# Patient Record
Sex: FEMALE | Race: WHITE | HISPANIC OR LATINO | Employment: UNEMPLOYED | URBAN - METROPOLITAN AREA
[De-identification: names, ages, dates, MRNs, and addresses within clinical notes are randomized per-mention and may not be internally consistent; named-entity substitution may affect disease eponyms.]

---

## 2017-07-05 ENCOUNTER — ALLSCRIPTS OFFICE VISIT (OUTPATIENT)
Dept: OTHER | Facility: OTHER | Age: 37
End: 2017-07-05

## 2017-07-05 DIAGNOSIS — G56.02 CARPAL TUNNEL SYNDROME OF LEFT WRIST: ICD-10-CM

## 2017-07-05 DIAGNOSIS — J02.9 ACUTE PHARYNGITIS: ICD-10-CM

## 2017-07-07 ENCOUNTER — APPOINTMENT (OUTPATIENT)
Dept: LAB | Facility: HOSPITAL | Age: 37
End: 2017-07-07
Payer: COMMERCIAL

## 2017-07-07 ENCOUNTER — ALLSCRIPTS OFFICE VISIT (OUTPATIENT)
Dept: OTHER | Facility: OTHER | Age: 37
End: 2017-07-07

## 2017-07-07 DIAGNOSIS — J02.9 ACUTE PHARYNGITIS: ICD-10-CM

## 2017-07-07 LAB — S PYO AG THROAT QL: NEGATIVE

## 2017-07-07 PROCEDURE — 87147 CULTURE TYPE IMMUNOLOGIC: CPT

## 2017-07-07 PROCEDURE — 87070 CULTURE OTHR SPECIMN AEROBIC: CPT

## 2017-07-10 ENCOUNTER — GENERIC CONVERSION - ENCOUNTER (OUTPATIENT)
Dept: OTHER | Facility: OTHER | Age: 37
End: 2017-07-10

## 2017-07-10 LAB — BACTERIA THROAT CULT: NORMAL

## 2017-10-30 ENCOUNTER — ALLSCRIPTS OFFICE VISIT (OUTPATIENT)
Dept: OTHER | Facility: OTHER | Age: 37
End: 2017-10-30

## 2017-10-31 NOTE — PROGRESS NOTES
Assessment  1  Strain of left trapezius muscle, initial encounter (840 8) (J18 297U)    Plan  Strain of left trapezius muscle, initial encounter    · Cyclobenzaprine HCl - 5 MG Oral Tablet; TAKE 1 TABLET 3 TIMES DAILY AS  NEEDED   · Naproxen 500 MG Oral Tablet; TAKE 1 TABLET EVERY 12 HOURS WITH FOOD  AS NEEDED   · Follow-up PRN Evaluation and Treatment  Follow-up  Status: Complete  Done:  [id number]    Discussion/Summary    Yarely Meléndez is stable on exam  She is to f/u PRN  left trapezius muscle strain  treat at this time with Naproxen PRN with food (she is to avoid taking OTC NSAIDs when using this med), and Cyclobenzaprine PRN for muscular spasm  She is to use heat to the region, stretch, and avoid lifting for a couple days  The patient was counseled regarding instructions for management,-- risk factor reductions,-- impressions,-- importance of compliance with treatment  Possible side effects of new medications were reviewed with the patient/guardian today  The treatment plan was reviewed with the patient/guardian  The patient/guardian understands and agrees with the treatment plan      Chief Complaint  PT is being seen today due to having pain on left side of neck that radiates down to shoulder  PT is not able to move her neck towards the left side due to pain  This started last night  No heavy lifting; Does not remember sleeping awkwardly  No referral down the arm  No CP/SOB  No hx of neck surgery  Pt is a   is not pregnant  History of Present Illness  HPI: As above  Review of Systems    Constitutional: as noted in HPI  Cardiovascular: as noted in HPI  Respiratory: as noted in HPI  Musculoskeletal: as noted in HPI  Neurological: as noted in HPI  Active Problems  1  Acute carpal tunnel syndrome of left wrist (354 0) (G56 02)   2  Acute tonsillitis, unspecified etiology (463) (J03 90)   3  Anxiety (300 00) (F41 9)   4  Encounter for routine pelvic examination (G22 31) (Z01 419)   5  Finger mass, right (782 2) (R22 31)   6  History of dermoid cyst excision (V15 29) (Z98 890,Z86 018)   7  Sore throat (462) (J02 9)    Past Medical History  1  History of Abnormal blood chemistry (790 6) (R79 9)   2  Childhood asthma (493 00) (J45 909)   3  History of Cyst, ovary, dermoid (220) (D27 9)   4  History of Exposure to STD (V01 6) (Z20 2)   5  History of shortness of breath (V13 89) (Z87 898)   6  History of Leg edema (782 3) (R60 0)   7  History of Lightheadedness (780 4) (R42)  Active Problems And Past Medical History Reviewed: The active problems and past medical history were reviewed and updated today  Family History  Mother    1  Family history of Depression  Father    2  Family history of Alcoholism   3  Family history of End Stage Renal Disease  Maternal Grandmother    4  Family history of Cataract   5  Family history of Hyperlipidemia  Maternal Grandfather    6  Family history of Type 2 Diabetes Mellitus    Social History   · Being A Social Drinker   · Currently sexually active   · Drinks coffee   · Exercise habits   · Never A Smoker   · Remote social alcohol use   · Denied: History of Uses drugs daily    Surgical History  1  History of Oophorectomy - Unilateral (Removal Of One Ovary)  Surgical History Reviewed: The surgical history was reviewed and updated today  Current Meds   1  Biotin TABS Recorded   2  Daily Multiple Vitamins Oral Tablet Recorded    The medication list was reviewed and updated today  Allergies  1  No Known Drug Allergies    Vitals   Recorded: 27SQK3227 06:19PM   Heart Rate 72   Respiration 16   Systolic 769   Diastolic 70   Height 5 ft 4 in   Weight 135 lb 8 oz   BMI Calculated 23 26   BSA Calculated 1 66     Physical Exam    Constitutional   General appearance: Abnormal   well developed,-- uncomfortable,-- well nourished,-- well hydrated-- and-- Uncomfortable due to her left neck pain; VSS; pleasant     Lymphatic   Palpation of lymph nodes in neck: No lymphadenopathy  -- Pt with TTP in the left lower para-cervical musculature, through the distribution of the left trapezius muscle; no step-offs or TTP over the spinous processes  Musculoskeletal   Gait and station: Normal     Neurologic   Reflexes: 2+ and symmetric  Deep tendon reflexes: 2+ right biceps,-- 2+ left biceps,-- 2+ right triceps,-- 2+ left triceps,-- 2+ right brachioradialis-- and-- 2+ left brachioradialis  -- Muscle strength equal and normal in the bilateral upper extremities  Sensation: No sensory loss  -- Normal in the bilateral upper extremities     Psychiatric   Orientation to person, place, and time: Normal     Mood and affect: Normal          Signatures   Electronically signed by : Sudeep Lucero DO; Oct 30 2017  7:00PM EST                       (Author)

## 2018-01-11 NOTE — RESULT NOTES
Discussion/Summary   throat culture grew Group C strep  continue amoxicillin as directed     - Dr Lg Mcintosh      Verified Results  (1) THROAT CULTURE (CULTURE, UPPER RESPIRATORY) [id number] 09:47PM Nicole Bajwa Order Number: [id number]     Test Name Result Flag Reference   CLINICAL REPORT (Report)     Test:        Throat culture  Specimen Type:   Throat  Specimen Date:   7/7/2017 9:47 PM  Result Date:    7/10/2017 8:15 AM  Result Status:   Final result  Resulting Lab:   [place] [address]            Tel: [phone]      CULTURE                                       ------------------                                   Few Colonies of Beta Hemolytic Streptococcus Group C     *** This organism is intrinisically susceptible to Penicillin  If sensitivites to other antibiotics are required, please call the      Microbiology Department at [phone] within 5 days   ***       Signatures   Electronically signed by : Zelalem Fregoso MD; Jul 10 2017  9:19AM EST                       (Author)

## 2018-01-13 VITALS
HEART RATE: 72 BPM | HEIGHT: 64 IN | RESPIRATION RATE: 16 BRPM | WEIGHT: 135.5 LBS | DIASTOLIC BLOOD PRESSURE: 70 MMHG | SYSTOLIC BLOOD PRESSURE: 100 MMHG | BODY MASS INDEX: 23.13 KG/M2

## 2018-01-13 VITALS
RESPIRATION RATE: 16 BRPM | TEMPERATURE: 98.2 F | HEART RATE: 80 BPM | SYSTOLIC BLOOD PRESSURE: 110 MMHG | DIASTOLIC BLOOD PRESSURE: 82 MMHG | WEIGHT: 131.38 LBS

## 2018-01-14 VITALS
HEART RATE: 72 BPM | WEIGHT: 133 LBS | SYSTOLIC BLOOD PRESSURE: 110 MMHG | BODY MASS INDEX: 22.71 KG/M2 | DIASTOLIC BLOOD PRESSURE: 72 MMHG | HEIGHT: 64 IN

## 2018-07-02 ENCOUNTER — OFFICE VISIT (OUTPATIENT)
Dept: FAMILY MEDICINE CLINIC | Facility: CLINIC | Age: 38
End: 2018-07-02
Payer: COMMERCIAL

## 2018-07-02 VITALS
RESPIRATION RATE: 16 BRPM | HEART RATE: 70 BPM | DIASTOLIC BLOOD PRESSURE: 68 MMHG | SYSTOLIC BLOOD PRESSURE: 114 MMHG | WEIGHT: 133 LBS | BODY MASS INDEX: 22.83 KG/M2

## 2018-07-02 DIAGNOSIS — L30.9 DERMATITIS: Primary | ICD-10-CM

## 2018-07-02 PROCEDURE — 99213 OFFICE O/P EST LOW 20 MIN: CPT | Performed by: FAMILY MEDICINE

## 2018-07-02 RX ORDER — ASCORBATE CALCIUM 500 MG
500 TABLET ORAL
COMMUNITY
End: 2019-08-20

## 2018-07-02 RX ORDER — DIPHENOXYLATE HYDROCHLORIDE AND ATROPINE SULFATE 2.5; .025 MG/1; MG/1
1 TABLET ORAL
COMMUNITY
End: 2022-08-09

## 2018-07-02 RX ORDER — BIOTIN 10 MG
TABLET ORAL
COMMUNITY
End: 2019-08-20

## 2018-07-02 NOTE — PROGRESS NOTES
Assessment/Plan:    No problem-specific Assessment & Plan notes found for this encounter  Diagnoses and all orders for this visit:    Dermatitis  Comments:  Possible mild folliculitis - resolving  Continue OTC Topical abtbx BID x 3 more days  Keep area dry  Call if any further issues  Other orders  -     multivitamin (THERAGRAN) TABS; Take 1 tablet by mouth  -     Omega-3 Fatty Acids (FISH OIL PO); Take 2 g by mouth  -     CALCIUM CARBONATE-VITAMIN D PO; Take 1 tablet by mouth  -     Calcium Ascorbate 500 MG TABS; Take 500 mg by mouth  -     Biotin 10 MG TABS; Take by mouth          Subjective:      Patient ID: Mag Palmer is a 43 y o  female  Pt with a pimple lesion to the pubic region x 4 days - > noticed after doing a lot of walking, and then it burned when showering  OTC Triple Antibiotic ointment seems to have helped  Chaperone / Standby for today's exam:  Sarahann Cranker, [place]  Back Pain   Pertinent negatives include no fever  The following portions of the patient's history were reviewed and updated as appropriate: allergies, current medications, past family history, past social history, past surgical history and problem list     History reviewed  No pertinent past medical history  Current Outpatient Prescriptions:     Biotin 10 MG TABS, Take by mouth, Disp: , Rfl:     Calcium Ascorbate 500 MG TABS, Take 500 mg by mouth, Disp: , Rfl:     CALCIUM CARBONATE-VITAMIN D PO, Take 1 tablet by mouth, Disp: , Rfl:     multivitamin (THERAGRAN) TABS, Take 1 tablet by mouth, Disp: , Rfl:     Omega-3 Fatty Acids (FISH OIL PO), Take 2 g by mouth, Disp: , Rfl:   No current facility-administered medications for this visit       Facility-Administered Medications Ordered in Other Visits:     lidocaine-epinephrine (XYLOCAINE/EPINEPHRINE) 1 %-1:100,000 20 mL, sodium bicarbonate 2 mEq infiltration, , Infiltration, Once, Brian Fernando MD    No Known Allergies      Review of Systems Constitutional: Negative for activity change and fever  Skin: Positive for rash  Objective:      /68 (BP Location: Right arm, Patient Position: Sitting, Cuff Size: Standard)   Pulse 70   Resp 16   Wt 60 3 kg (133 lb)   BMI 22 83 kg/m²          Physical Exam   Constitutional: She is oriented to person, place, and time  She appears well-developed and well-nourished  No distress  HENT:   Head: Normocephalic and atraumatic  Genitourinary:       There is no rash on the right labia  There is no rash on the left labia  Neurological: She is alert and oriented to person, place, and time  Skin: She is not diaphoretic  Psychiatric: She has a normal mood and affect  Her behavior is normal  Judgment and thought content normal    Nursing note and vitals reviewed

## 2019-01-16 ENCOUNTER — OFFICE VISIT (OUTPATIENT)
Dept: FAMILY MEDICINE CLINIC | Facility: CLINIC | Age: 39
End: 2019-01-16

## 2019-01-16 ENCOUNTER — TELEPHONE (OUTPATIENT)
Dept: FAMILY MEDICINE CLINIC | Facility: CLINIC | Age: 39
End: 2019-01-16

## 2019-01-16 VITALS
RESPIRATION RATE: 16 BRPM | WEIGHT: 139.4 LBS | BODY MASS INDEX: 24.7 KG/M2 | HEIGHT: 63 IN | SYSTOLIC BLOOD PRESSURE: 118 MMHG | DIASTOLIC BLOOD PRESSURE: 78 MMHG | HEART RATE: 78 BPM | TEMPERATURE: 98.6 F | OXYGEN SATURATION: 96 %

## 2019-01-16 DIAGNOSIS — J20.9 ACUTE BRONCHITIS, UNSPECIFIED ORGANISM: Primary | ICD-10-CM

## 2019-01-16 PROCEDURE — 99212 OFFICE O/P EST SF 10 MIN: CPT | Performed by: FAMILY MEDICINE

## 2019-01-16 RX ORDER — PROMETHAZINE HYDROCHLORIDE AND CODEINE PHOSPHATE 6.25; 1 MG/5ML; MG/5ML
5 SYRUP ORAL EVERY 6 HOURS PRN
Qty: 180 ML | Refills: 0 | Status: SHIPPED | OUTPATIENT
Start: 2019-01-16 | End: 2019-08-20

## 2019-01-16 RX ORDER — AZITHROMYCIN 250 MG/1
TABLET, FILM COATED ORAL
Qty: 6 TABLET | Refills: 0 | Status: SHIPPED | OUTPATIENT
Start: 2019-01-16 | End: 2019-01-21

## 2019-01-16 NOTE — TELEPHONE ENCOUNTER
Patient saw you today and at check out mentioned something about you sending something for her cough she said it was going to have codeine in it   Please advise    Pharmacy is [facility]

## 2019-01-16 NOTE — PROGRESS NOTES
Assessment/Plan:    No problem-specific Assessment & Plan notes found for this encounter  Diagnoses and all orders for this visit:    Acute bronchitis, unspecified organism  Comments:  Pt stable on exam   Treating with Azithromycin, warm salt water gargles, OTC Cough/Cold Preps PRN, Albuterol PRN (has), rest, and good PO hydration  Orders:  -     azithromycin (ZITHROMAX) 250 mg tablet; Take 2 tablets by mouth on day #1, then 1 tab daily for four more days  -     promethazine-codeine (PHENERGAN WITH CODEINE) 6 25-10 mg/5 mL syrup; Take 5 mL by mouth every 6 (six) hours as needed for cough          Subjective:      Patient ID: Stacia Smith is a 43 y o  female  Zelphia Brakeman has been sick over the last 2 weeks  Started with fevers and ache; this went to congestion and ST; now with a bad cough  She is not pregnant at this time  The following portions of the patient's history were reviewed and updated as appropriate: allergies, current medications, past family history, past medical history, past surgical history and problem list     Past Medical History:   Diagnosis Date    Asthma     Last Assessed 08Jun2012         Current Outpatient Prescriptions:     azithromycin (ZITHROMAX) 250 mg tablet, Take 2 tablets by mouth on day #1, then 1 tab daily for four more days  , Disp: 6 tablet, Rfl: 0    Biotin 10 MG TABS, Take by mouth, Disp: , Rfl:     Calcium Ascorbate 500 MG TABS, Take 500 mg by mouth, Disp: , Rfl:     CALCIUM CARBONATE-VITAMIN D PO, Take 1 tablet by mouth, Disp: , Rfl:     multivitamin (THERAGRAN) TABS, Take 1 tablet by mouth, Disp: , Rfl:     Omega-3 Fatty Acids (FISH OIL PO), Take 2 g by mouth, Disp: , Rfl:     promethazine-codeine (PHENERGAN WITH CODEINE) 6 25-10 mg/5 mL syrup, Take 5 mL by mouth every 6 (six) hours as needed for cough, Disp: 180 mL, Rfl: 0  No current facility-administered medications for this visit       Facility-Administered Medications Ordered in Other Visits:   lidocaine-epinephrine (XYLOCAINE/EPINEPHRINE) 1 %-1:100,000 20 mL, sodium bicarbonate 2 mEq infiltration, , Infiltration, Once, Jennyfer Macias MD    No Known Allergies    Review of Systems   Constitutional: Negative for activity change and fever  HENT: Positive for congestion  Negative for sore throat  Respiratory: Positive for cough  Musculoskeletal: Negative for myalgias  Objective:      /78 (BP Location: Right arm, Patient Position: Sitting, Cuff Size: Standard)   Pulse 78   Temp 98 6 °F (37 °C) (Tympanic)   Resp 16   Ht 5' 3" (1 6 m)   Wt 63 2 kg (139 lb 6 4 oz)   SpO2 96%   BMI 24 69 kg/m²          Physical Exam   Constitutional: She is oriented to person, place, and time  She appears well-developed and well-nourished  No distress  HENT:   Head: Normocephalic and atraumatic  Right Ear: Hearing, tympanic membrane, external ear and ear canal normal    Left Ear: Hearing, tympanic membrane, external ear and ear canal normal    Nose: Mucosal edema present  Mouth/Throat: Oropharynx is clear and moist  No oropharyngeal exudate  Eyes: Conjunctivae are normal    Neck: Normal range of motion  Neck supple  No thyromegaly present  Cardiovascular: Normal rate, regular rhythm and normal heart sounds  Exam reveals no gallop and no friction rub  No murmur heard  Pulmonary/Chest: Effort normal and breath sounds normal  No respiratory distress  She has no wheezes  She has no rales  Lymphadenopathy:     She has no cervical adenopathy  Neurological: She is alert and oriented to person, place, and time  Skin: She is not diaphoretic  Psychiatric: She has a normal mood and affect  Her behavior is normal  Judgment and thought content normal    Nursing note and vitals reviewed

## 2019-08-20 ENCOUNTER — OFFICE VISIT (OUTPATIENT)
Dept: FAMILY MEDICINE CLINIC | Facility: CLINIC | Age: 39
End: 2019-08-20
Payer: COMMERCIAL

## 2019-08-20 VITALS
HEART RATE: 78 BPM | RESPIRATION RATE: 16 BRPM | DIASTOLIC BLOOD PRESSURE: 76 MMHG | WEIGHT: 138 LBS | HEIGHT: 63 IN | OXYGEN SATURATION: 99 % | BODY MASS INDEX: 24.45 KG/M2 | SYSTOLIC BLOOD PRESSURE: 102 MMHG | TEMPERATURE: 97.8 F

## 2019-08-20 DIAGNOSIS — L65.9 HAIR LOSS: Primary | ICD-10-CM

## 2019-08-20 DIAGNOSIS — R53.83 OTHER FATIGUE: ICD-10-CM

## 2019-08-20 PROCEDURE — 3008F BODY MASS INDEX DOCD: CPT | Performed by: FAMILY MEDICINE

## 2019-08-20 PROCEDURE — 1036F TOBACCO NON-USER: CPT | Performed by: FAMILY MEDICINE

## 2019-08-20 PROCEDURE — 99213 OFFICE O/P EST LOW 20 MIN: CPT | Performed by: FAMILY MEDICINE

## 2019-08-20 NOTE — PROGRESS NOTES
Assessment/Plan:    No problem-specific Assessment & Plan notes found for this encounter  Diagnoses and all orders for this visit:    Hair loss  Comments:  Pt is stable on exam   Pt to avoid pulling hair back tight in ponytail  Check non-fasting labs - if TSH is normal, would likely then refer to Dermatology  Orders:  -     Comprehensive metabolic panel; Future  -     CBC and differential; Future  -     TSH, 3rd generation with Free T4 reflex; Future    Other fatigue  -     Comprehensive metabolic panel; Future  -     CBC and differential; Future  -     TSH, 3rd generation with Free T4 reflex; Future    Other orders  -     Cholecalciferol 1000 units capsule          Subjective:      Patient ID: Hellen Kan is a 35 y o  female  Increasing hair loss in the last 2 months; no changes in weight  Mother with hx of hyperthyroidism  No new meds / supplements  Has not colored her hair recently  The following portions of the patient's history were reviewed and updated as appropriate: allergies, current medications, past family history, past social history, past surgical history and problem list     Past Medical History:   Diagnosis Date    Asthma     Last Assessed 08Jun2012     Family History   Problem Relation Age of Onset    Depression Mother     Alcohol abuse Father     Kidney disease Father     Cataracts Maternal Grandmother     Hyperlipidemia Maternal Grandmother     Diabetes Maternal Grandfather        Current Outpatient Medications:     CALCIUM CARBONATE-VITAMIN D PO, Take 1 tablet by mouth, Disp: , Rfl:     Cholecalciferol 1000 units capsule, , Disp: , Rfl:     multivitamin (THERAGRAN) TABS, Take 1 tablet by mouth, Disp: , Rfl:     Omega-3 Fatty Acids (FISH OIL PO), Take 2 g by mouth, Disp: , Rfl:   No current facility-administered medications for this visit       Facility-Administered Medications Ordered in Other Visits:     lidocaine-epinephrine (XYLOCAINE/EPINEPHRINE) 1 %-1:100,000 20 mL, sodium bicarbonate 2 mEq infiltration, , Infiltration, Once, Paul Bautista MD    No Known Allergies    Social History     Tobacco Use    Smoking status: Never Smoker   Substance Use Topics    Alcohol use: Yes     Comment: social    Drug use: No         Review of Systems   Constitutional: Negative for activity change and unexpected weight change  Respiratory: Negative for shortness of breath  Cardiovascular: Negative for chest pain  Gastrointestinal: Negative for constipation and diarrhea  Skin:        +Hair loss  Psychiatric/Behavioral: Negative for dysphoric mood  The patient is not nervous/anxious  Objective:      /76 (BP Location: Left arm, Patient Position: Sitting, Cuff Size: Standard)   Pulse 78   Temp 97 8 °F (36 6 °C) (Tympanic)   Resp 16   Ht 5' 3" (1 6 m)   Wt 62 6 kg (138 lb)   SpO2 99%   BMI 24 45 kg/m²          Physical Exam   Constitutional: She is oriented to person, place, and time  She appears well-developed and well-nourished  No distress  HENT:   Head: Normocephalic and atraumatic  Eyes: Conjunctivae are normal    Neck: Normal range of motion  Neck supple  No thyromegaly present  Cardiovascular: Normal rate, regular rhythm and normal heart sounds  Exam reveals no gallop and no friction rub  No murmur heard  Pulmonary/Chest: Effort normal and breath sounds normal  No stridor  No respiratory distress  She has no wheezes  She has no rales  Lymphadenopathy:     She has no cervical adenopathy  Neurological: She is alert and oriented to person, place, and time  Reflex Scores:       Bicep reflexes are 2+ on the right side and 2+ on the left side  Brachioradialis reflexes are 2+ on the right side and 2+ on the left side  Patellar reflexes are 2+ on the right side and 2+ on the left side  Skin: She is not diaphoretic  Psychiatric: She has a normal mood and affect   Her behavior is normal  Judgment and thought content normal    Nursing note and vitals reviewed

## 2019-11-04 ENCOUNTER — OFFICE VISIT (OUTPATIENT)
Dept: FAMILY MEDICINE CLINIC | Facility: CLINIC | Age: 39
End: 2019-11-04

## 2019-11-04 VITALS
HEIGHT: 63 IN | RESPIRATION RATE: 16 BRPM | SYSTOLIC BLOOD PRESSURE: 114 MMHG | HEART RATE: 85 BPM | OXYGEN SATURATION: 98 % | BODY MASS INDEX: 25.02 KG/M2 | DIASTOLIC BLOOD PRESSURE: 84 MMHG | WEIGHT: 141.2 LBS

## 2019-11-04 DIAGNOSIS — F43.9 SITUATIONAL STRESS: Primary | ICD-10-CM

## 2019-11-04 DIAGNOSIS — F51.04 PSYCHOPHYSIOLOGICAL INSOMNIA: ICD-10-CM

## 2019-11-04 PROCEDURE — 99213 OFFICE O/P EST LOW 20 MIN: CPT | Performed by: NURSE PRACTITIONER

## 2019-11-04 RX ORDER — ESCITALOPRAM OXALATE 10 MG/1
10 TABLET ORAL DAILY
Qty: 30 TABLET | Refills: 5 | Status: SHIPPED | OUTPATIENT
Start: 2019-11-04 | End: 2020-11-25

## 2019-11-04 RX ORDER — ZOLPIDEM TARTRATE 5 MG/1
5 TABLET ORAL
Qty: 30 TABLET | Refills: 1 | Status: SHIPPED | OUTPATIENT
Start: 2019-11-04 | End: 2020-11-25

## 2019-11-04 NOTE — PROGRESS NOTES
Assessment/Plan:           Problem List Items Addressed This Visit        Other    Psychophysiological insomnia    Relevant Medications    zolpidem (AMBIEN) 5 mg tablet    Situational stress - Primary    Relevant Medications    escitalopram (LEXAPRO) 10 mg tablet            Subjective:      Patient ID: Leny Martell is a 35 y o  female  HPI    The following portions of the patient's history were reviewed and updated as appropriate: allergies, current medications, past family history, past medical history, past social history, past surgical history and problem list     Review of Systems   Constitutional: Negative for fatigue and fever  Respiratory: Negative for cough, shortness of breath and wheezing  Cardiovascular: Negative for chest pain and palpitations  Gastrointestinal: Negative for constipation, diarrhea, nausea and vomiting  Skin: Negative for rash  Neurological: Negative for dizziness, light-headedness and headaches  Hematological: Negative for adenopathy  Psychiatric/Behavioral: Positive for dysphoric mood and sleep disturbance  Negative for agitation, behavioral problems, confusion, decreased concentration, hallucinations, self-injury and suicidal ideas  The patient is not nervous/anxious and is not hyperactive            Objective:      /84 (BP Location: Left arm, Patient Position: Sitting, Cuff Size: Standard)   Pulse 85   Resp 16   Ht 5' 3" (1 6 m)   Wt 64 kg (141 lb 3 2 oz)   SpO2 98%   BMI 25 01 kg/m²     Family History   Problem Relation Age of Onset    Depression Mother     Alcohol abuse Father     Kidney disease Father     Cataracts Maternal Grandmother     Hyperlipidemia Maternal Grandmother     Diabetes Maternal Grandfather      Past Medical History:   Diagnosis Date    Asthma     Last Assessed [id number]     Social History     Socioeconomic History    Marital status: Single     Spouse name: Not on file    Number of children: Not on file    Years of education: Not on file    Highest education level: Not on file   Occupational History    Not on file   Social Needs    Financial resource strain: Not on file    Food insecurity:     Worry: Not on file     Inability: Not on file    Transportation needs:     Medical: Not on file     Non-medical: Not on file   Tobacco Use    Smoking status: Never Smoker   Substance and Sexual Activity    Alcohol use: Yes     Comment: social    Drug use: No    Sexual activity: Yes   Lifestyle    Physical activity:     Days per week: Not on file     Minutes per session: Not on file    Stress: Not on file   Relationships    Social connections:     Talks on phone: Not on file     Gets together: Not on file     Attends Taoist service: Not on file     Active member of club or organization: Not on file     Attends meetings of clubs or organizations: Not on file     Relationship status: Not on file    Intimate partner violence:     Fear of current or ex partner: Not on file     Emotionally abused: Not on file     Physically abused: Not on file     Forced sexual activity: Not on file   Other Topics Concern    Not on file   Social History Narrative    Drinks coffee       Current Outpatient Medications:     CALCIUM CARBONATE-VITAMIN D PO, Take 1 tablet by mouth, Disp: , Rfl:     Cholecalciferol 1000 units capsule, , Disp: , Rfl:     multivitamin (THERAGRAN) TABS, Take 1 tablet by mouth, Disp: , Rfl:     Omega-3 Fatty Acids (FISH OIL PO), Take 2 g by mouth, Disp: , Rfl:     escitalopram (LEXAPRO) 10 mg tablet, Take 1 tablet (10 mg total) by mouth daily, Disp: 30 tablet, Rfl: 5    zolpidem (AMBIEN) 5 mg tablet, Take 1 tablet (5 mg total) by mouth daily at bedtime as needed for sleep, Disp: 30 tablet, Rfl: 1  No current facility-administered medications for this visit       Facility-Administered Medications Ordered in Other Visits:     lidocaine-epinephrine (XYLOCAINE/EPINEPHRINE) 1 %-1:100,000 20 mL, sodium bicarbonate 2 mEq infiltration, , Infiltration, Once, Vandy Runner, MD  No Known Allergies     Physical Exam   Constitutional: She is oriented to person, place, and time  She appears well-developed and well-nourished  HENT:   Head: Normocephalic and atraumatic  Right Ear: External ear normal    Left Ear: External ear normal    Nose: Nose normal    Mouth/Throat: Oropharynx is clear and moist    Eyes: Conjunctivae are normal    Neck: Normal range of motion  Neck supple  No thyromegaly present  Cardiovascular: Normal rate, regular rhythm and normal heart sounds  Pulmonary/Chest: Effort normal and breath sounds normal    Abdominal: Soft  Bowel sounds are normal    Musculoskeletal: Normal range of motion  Neurological: She is alert and oriented to person, place, and time  Skin: Skin is warm and dry  Capillary refill takes less than 2 seconds  Psychiatric: She has a normal mood and affect  Her behavior is normal  Judgment and thought content normal    Nursing note and vitals reviewed

## 2019-11-18 ENCOUNTER — TELEPHONE (OUTPATIENT)
Dept: FAMILY MEDICINE CLINIC | Facility: CLINIC | Age: 39
End: 2019-11-18

## 2019-11-18 NOTE — TELEPHONE ENCOUNTER
Left voice msg for pt - Per Ko Hanks she can try doubling medication to 10mg at bedtime and see if that helps and to call us and we can refill prescription for 10mg when needed

## 2020-01-10 ENCOUNTER — OFFICE VISIT (OUTPATIENT)
Dept: FAMILY MEDICINE CLINIC | Facility: CLINIC | Age: 40
End: 2020-01-10
Payer: COMMERCIAL

## 2020-01-10 VITALS
BODY MASS INDEX: 25.87 KG/M2 | RESPIRATION RATE: 16 BRPM | TEMPERATURE: 98.7 F | HEART RATE: 79 BPM | DIASTOLIC BLOOD PRESSURE: 62 MMHG | OXYGEN SATURATION: 99 % | HEIGHT: 63 IN | SYSTOLIC BLOOD PRESSURE: 100 MMHG | WEIGHT: 146 LBS

## 2020-01-10 DIAGNOSIS — R19.5 LOOSE STOOLS: Primary | ICD-10-CM

## 2020-01-10 PROCEDURE — 3008F BODY MASS INDEX DOCD: CPT | Performed by: NURSE PRACTITIONER

## 2020-01-10 PROCEDURE — 1036F TOBACCO NON-USER: CPT | Performed by: NURSE PRACTITIONER

## 2020-01-10 PROCEDURE — 99213 OFFICE O/P EST LOW 20 MIN: CPT | Performed by: NURSE PRACTITIONER

## 2020-01-10 NOTE — PROGRESS NOTES
Assessment/Plan:           Problem List Items Addressed This Visit        Other    Loose stools - Primary     To restart probiotic daily  Stool culture  F/u if no resolution or worsens           Relevant Orders    Clostridium difficile toxin by PCR with EIA            Subjective:      Patient ID: Alicia Roper is a 35 y o  female  Here for c/o loose stools  Was on clindamycin for dental procedure, was to take probiotics, did not  Now with loose stools with mucous  No blood  Not all the time  No abd cramping or pain  No foul smell  Did not restart probiotics        The following portions of the patient's history were reviewed and updated as appropriate: allergies, current medications, past family history, past medical history, past social history, past surgical history and problem list     Review of Systems   Constitutional: Negative for fever and unexpected weight change  Respiratory: Negative for cough and shortness of breath  Cardiovascular: Negative for chest pain and palpitations  Gastrointestinal: Positive for diarrhea  Negative for abdominal distention, abdominal pain, constipation, nausea and vomiting  Musculoskeletal: Negative for arthralgias and myalgias  Skin: Negative for rash  Neurological: Negative for dizziness, light-headedness and headaches  Hematological: Negative for adenopathy  Psychiatric/Behavioral: Negative for dysphoric mood  The patient is not nervous/anxious            Objective:      /62 (BP Location: Left arm, Patient Position: Sitting, Cuff Size: Standard)   Pulse 79   Temp 98 7 °F (37 1 °C) (Tympanic)   Resp 16   Ht 5' 3" (1 6 m)   Wt 66 2 kg (146 lb)   SpO2 99%   BMI 25 86 kg/m²     Family History   Problem Relation Age of Onset    Depression Mother     Alcohol abuse Father     Kidney disease Father     Cataracts Maternal Grandmother     Hyperlipidemia Maternal Grandmother     Diabetes Maternal Grandfather      Past Medical History:   Diagnosis Date  Asthma     Last Assessed 08Jun2012     Social History     Socioeconomic History    Marital status: Single     Spouse name: Not on file    Number of children: Not on file    Years of education: Not on file    Highest education level: Not on file   Occupational History    Not on file   Social Needs    Financial resource strain: Not on file    Food insecurity:     Worry: Not on file     Inability: Not on file    Transportation needs:     Medical: Not on file     Non-medical: Not on file   Tobacco Use    Smoking status: Never Smoker    Smokeless tobacco: Never Used   Substance and Sexual Activity    Alcohol use: Yes     Comment: social    Drug use: No    Sexual activity: Yes   Lifestyle    Physical activity:     Days per week: Not on file     Minutes per session: Not on file    Stress: Not on file   Relationships    Social connections:     Talks on phone: Not on file     Gets together: Not on file     Attends Christianity service: Not on file     Active member of club or organization: Not on file     Attends meetings of clubs or organizations: Not on file     Relationship status: Not on file    Intimate partner violence:     Fear of current or ex partner: Not on file     Emotionally abused: Not on file     Physically abused: Not on file     Forced sexual activity: Not on file   Other Topics Concern    Not on file   Social History Narrative    Drinks coffee       Current Outpatient Medications:     CALCIUM CARBONATE-VITAMIN D PO, Take 1 tablet by mouth, Disp: , Rfl:     multivitamin (THERAGRAN) TABS, Take 1 tablet by mouth, Disp: , Rfl:     Omega-3 Fatty Acids (FISH OIL PO), Take 2 g by mouth, Disp: , Rfl:     Cholecalciferol 1000 units capsule, , Disp: , Rfl:     escitalopram (LEXAPRO) 10 mg tablet, Take 1 tablet (10 mg total) by mouth daily (Patient not taking: Reported on 1/10/2020), Disp: 30 tablet, Rfl: 5    zolpidem (AMBIEN) 5 mg tablet, Take 1 tablet (5 mg total) by mouth daily at bedtime as needed for sleep (Patient not taking: Reported on 1/10/2020), Disp: 30 tablet, Rfl: 1  No current facility-administered medications for this visit  Facility-Administered Medications Ordered in Other Visits:     lidocaine-epinephrine (XYLOCAINE/EPINEPHRINE) 1 %-1:100,000 20 mL, sodium bicarbonate 2 mEq infiltration, , Infiltration, Once, Rober Sadler MD  No Known Allergies     Physical Exam   Constitutional: She is oriented to person, place, and time  She appears well-developed and well-nourished  Eyes: Conjunctivae are normal    Cardiovascular: Normal rate, regular rhythm and normal heart sounds  Pulmonary/Chest: Effort normal and breath sounds normal    Abdominal: Soft  Bowel sounds are normal    Musculoskeletal: Normal range of motion  Neurological: She is alert and oriented to person, place, and time  Skin: Skin is warm and dry  Capillary refill takes less than 2 seconds  Psychiatric: She has a normal mood and affect  Her behavior is normal    Nursing note and vitals reviewed  BMI Counseling: Body mass index is 25 86 kg/m²  The BMI is above normal  Nutrition recommendations include reducing portion sizes, decreasing overall calorie intake, 3-5 servings of fruits/vegetables daily, reducing fast food intake, consuming healthier snacks, decreasing soda and/or juice intake, moderation in carbohydrate intake, increasing intake of lean protein, reducing intake of saturated fat and trans fat and reducing intake of cholesterol  Exercise recommendations include moderate aerobic physical activity for 150 minutes/week, exercising 3-5 times per week and strength training exercises

## 2020-01-13 PROBLEM — R19.5 LOOSE STOOLS: Status: ACTIVE | Noted: 2020-01-13

## 2020-10-27 ENCOUNTER — TELEPHONE (OUTPATIENT)
Dept: OBGYN CLINIC | Facility: HOSPITAL | Age: 40
End: 2020-10-27

## 2020-10-28 ENCOUNTER — OFFICE VISIT (OUTPATIENT)
Dept: OBGYN CLINIC | Facility: HOSPITAL | Age: 40
End: 2020-10-28
Payer: COMMERCIAL

## 2020-10-28 VITALS
DIASTOLIC BLOOD PRESSURE: 65 MMHG | HEIGHT: 63 IN | SYSTOLIC BLOOD PRESSURE: 100 MMHG | WEIGHT: 152.8 LBS | BODY MASS INDEX: 27.07 KG/M2 | HEART RATE: 88 BPM

## 2020-10-28 DIAGNOSIS — M77.11 LATERAL EPICONDYLITIS OF RIGHT ELBOW: Primary | ICD-10-CM

## 2020-10-28 PROCEDURE — 99203 OFFICE O/P NEW LOW 30 MIN: CPT | Performed by: ORTHOPAEDIC SURGERY

## 2020-10-28 PROCEDURE — 3008F BODY MASS INDEX DOCD: CPT | Performed by: ORTHOPAEDIC SURGERY

## 2020-11-12 ENCOUNTER — EVALUATION (OUTPATIENT)
Dept: OCCUPATIONAL THERAPY | Age: 40
End: 2020-11-12
Payer: COMMERCIAL

## 2020-11-12 DIAGNOSIS — M77.11 LATERAL EPICONDYLITIS OF RIGHT ELBOW: Primary | ICD-10-CM

## 2020-11-12 PROCEDURE — 97165 OT EVAL LOW COMPLEX 30 MIN: CPT

## 2020-11-12 PROCEDURE — 97110 THERAPEUTIC EXERCISES: CPT

## 2020-11-25 DIAGNOSIS — F51.04 PSYCHOPHYSIOLOGICAL INSOMNIA: ICD-10-CM

## 2020-11-25 DIAGNOSIS — F43.9 SITUATIONAL STRESS: ICD-10-CM

## 2020-11-25 RX ORDER — ESCITALOPRAM OXALATE 10 MG/1
TABLET ORAL
Qty: 30 TABLET | Refills: 5 | Status: SHIPPED | OUTPATIENT
Start: 2020-11-25 | End: 2021-07-23 | Stop reason: ALTCHOICE

## 2020-11-25 RX ORDER — ZOLPIDEM TARTRATE 5 MG/1
TABLET ORAL
Qty: 30 TABLET | Refills: 5 | Status: SHIPPED | OUTPATIENT
Start: 2020-11-25 | End: 2021-07-23 | Stop reason: ALTCHOICE

## 2020-12-30 ENCOUNTER — TELEMEDICINE (OUTPATIENT)
Dept: FAMILY MEDICINE CLINIC | Facility: CLINIC | Age: 40
End: 2020-12-30
Payer: COMMERCIAL

## 2020-12-30 DIAGNOSIS — U07.1 COVID-19: Primary | ICD-10-CM

## 2020-12-30 PROCEDURE — 99213 OFFICE O/P EST LOW 20 MIN: CPT | Performed by: FAMILY MEDICINE

## 2020-12-31 ENCOUNTER — TELEMEDICINE (OUTPATIENT)
Dept: FAMILY MEDICINE CLINIC | Facility: CLINIC | Age: 40
End: 2020-12-31
Payer: COMMERCIAL

## 2020-12-31 DIAGNOSIS — U07.1 COVID-19: Primary | ICD-10-CM

## 2020-12-31 PROCEDURE — 99213 OFFICE O/P EST LOW 20 MIN: CPT | Performed by: FAMILY MEDICINE

## 2021-01-21 ENCOUNTER — TELEPHONE (OUTPATIENT)
Dept: FAMILY MEDICINE CLINIC | Facility: CLINIC | Age: 41
End: 2021-01-21

## 2021-01-21 DIAGNOSIS — E55.9 VITAMIN D DEFICIENCY: ICD-10-CM

## 2021-01-21 DIAGNOSIS — E83.42 HYPOMAGNESEMIA: Primary | ICD-10-CM

## 2021-01-21 NOTE — TELEPHONE ENCOUNTER
Patient called and wanted to know if you can order a magnesium lab, she said some of her symptoms are a cause of low magnesium, joint pain, and tired  she said she has been reading a lot about and wanted to know if you would order the lab  Please advise    Lab   [facility]

## 2021-01-23 ENCOUNTER — LAB (OUTPATIENT)
Dept: LAB | Facility: HOSPITAL | Age: 41
End: 2021-01-23
Payer: COMMERCIAL

## 2021-01-23 DIAGNOSIS — E55.9 VITAMIN D DEFICIENCY: ICD-10-CM

## 2021-01-23 DIAGNOSIS — E83.42 HYPOMAGNESEMIA: ICD-10-CM

## 2021-01-23 LAB
25(OH)D3 SERPL-MCNC: 22.4 NG/ML (ref 30–100)
MAGNESIUM SERPL-MCNC: 2.2 MG/DL (ref 1.6–2.6)

## 2021-01-23 PROCEDURE — 82306 VITAMIN D 25 HYDROXY: CPT

## 2021-01-23 PROCEDURE — 83735 ASSAY OF MAGNESIUM: CPT

## 2021-01-23 PROCEDURE — 36415 COLL VENOUS BLD VENIPUNCTURE: CPT

## 2021-02-04 ENCOUNTER — TELEMEDICINE (OUTPATIENT)
Dept: FAMILY MEDICINE CLINIC | Facility: CLINIC | Age: 41
End: 2021-02-04
Payer: COMMERCIAL

## 2021-02-04 DIAGNOSIS — J02.9 SORE THROAT: Primary | ICD-10-CM

## 2021-02-04 PROCEDURE — 99213 OFFICE O/P EST LOW 20 MIN: CPT | Performed by: FAMILY MEDICINE

## 2021-02-04 RX ORDER — AMOXICILLIN 500 MG/1
500 CAPSULE ORAL 2 TIMES DAILY
Qty: 20 CAPSULE | Refills: 0 | Status: SHIPPED | OUTPATIENT
Start: 2021-02-04 | End: 2021-02-14

## 2021-02-04 NOTE — PROGRESS NOTES
Virtual Regular Visit      Assessment/Plan:    Problem List Items Addressed This Visit     None      Visit Diagnoses     Sore throat    -  Primary    Pt stable on exam  Treating with Amoxicillin, warm salt water gargles, OTC Cough/Cold Preps PRN, rest, and good PO hydration  F/u PRN  Relevant Medications    amoxicillin (AMOXIL) 500 mg capsule               Reason for visit is   Chief Complaint   Patient presents with    Virtual Regular Visit        Encounter provider Jarrod Sales DO    Provider located at [address] [address]      Recent Visits  No visits were found meeting these conditions  Showing recent visits within past 7 days and meeting all other requirements     Today's Visits  Date Type Provider Dept   02/04/21 Telemedicine DO Bishnu Newby   Showing today's visits and meeting all other requirements     Future Appointments  No visits were found meeting these conditions  Showing future appointments within next 150 days and meeting all other requirements        The patient was identified by name and date of birth  Mariana Gorman was informed that this is a telemedicine visit and that the visit is being conducted through [facility] and patient was informed that this is a secure, HIPAA-compliant platform  She agrees to proceed     My office door was closed  No one else was in the room  She acknowledged consent and understanding of privacy and security of the video platform  The patient has agreed to participate and understands they can discontinue the visit at any time  Patient is aware this is a billable service  Subjective  Mariana Gorman is a 38 y o  female - Sore throat  Ruby Brought c/o a "very bad" ST over the last 3 days  No cough or fevers; no cold symptoms  Pt did have, and was cleared from, COVID-19 in late 12/2020  She has already had the first COV-19 Vaccine injection 1 - 2 weeks ago    Very low suspicion for recurrent COV-19 at this time - given the timing of her symptoms after having COV-19, etc        Past Medical History:   Diagnosis Date    Asthma     Last Assessed 08Jun2012       Past Surgical History:   Procedure Laterality Date    OOPHORECTOMY N/A     Lateral       Current Outpatient Medications   Medication Sig Dispense Refill    amoxicillin (AMOXIL) 500 mg capsule Take 1 capsule (500 mg total) by mouth 2 (two) times a day for 10 days 20 capsule 0    CALCIUM CARBONATE-VITAMIN D PO Take 1 tablet by mouth      Cholecalciferol 1000 units capsule       escitalopram (LEXAPRO) 10 mg tablet TAKE ONE TABLET BY MOUTH EVERY DAY 30 tablet 5    multivitamin (THERAGRAN) TABS Take 1 tablet by mouth      Omega-3 Fatty Acids (FISH OIL PO) Take 2 g by mouth      zolpidem (AMBIEN) 5 mg tablet TAKE ONE TABLET BY MOUTH AT BEDTIME AS NEEDED FOR SLEEP 30 tablet 5     No current facility-administered medications for this visit  Facility-Administered Medications Ordered in Other Visits   Medication Dose Route Frequency Provider Last Rate Last Admin    lidocaine-epinephrine (XYLOCAINE/EPINEPHRINE) 1 %-1:100,000 20 mL, sodium bicarbonate 2 mEq infiltration   Infiltration Once Dale Alvarez MD            No Known Allergies    Review of Systems   Constitutional: Negative for activity change and fever  HENT: Positive for sore throat  Negative for congestion and rhinorrhea  Respiratory: Negative for cough  Video Exam    There were no vitals filed for this visit  Physical Exam  Constitutional:       General: She is not in acute distress  Appearance: Normal appearance  She is not ill-appearing, toxic-appearing or diaphoretic  HENT:      Head: Normocephalic and atraumatic  Eyes:      General: No scleral icterus  Conjunctiva/sclera: Conjunctivae normal    Pulmonary:      Effort: Pulmonary effort is normal  No respiratory distress     Neurological:      Mental Status: She is alert and oriented to person, place, and time  Psychiatric:         Mood and Affect: Mood normal          Behavior: Behavior normal          Thought Content: Thought content normal          Judgment: Judgment normal           I spent 15 minutes with patient today in which greater than 50% of the time was spent in counseling/coordination of care regarding her symptoms  VIRTUAL VISIT DISCLAIMER    Jonathan Askew acknowledges that she has consented to an online visit or consultation  She understands that the online visit is based solely on information provided by her, and that, in the absence of a face-to-face physical evaluation by the physician, the diagnosis she receives is both limited and provisional in terms of accuracy and completeness  This is not intended to replace a full medical face-to-face evaluation by the physician  Jonathan Azar understands and accepts these terms

## 2021-04-19 ENCOUNTER — OFFICE VISIT (OUTPATIENT)
Dept: URGENT CARE | Age: 41
End: 2021-04-19
Payer: COMMERCIAL

## 2021-04-19 ENCOUNTER — APPOINTMENT (OUTPATIENT)
Dept: RADIOLOGY | Age: 41
End: 2021-04-19
Payer: COMMERCIAL

## 2021-04-19 VITALS
OXYGEN SATURATION: 97 % | HEART RATE: 107 BPM | RESPIRATION RATE: 18 BRPM | DIASTOLIC BLOOD PRESSURE: 54 MMHG | TEMPERATURE: 97.2 F | SYSTOLIC BLOOD PRESSURE: 110 MMHG

## 2021-04-19 DIAGNOSIS — M25.561 ACUTE PAIN OF RIGHT KNEE: Primary | ICD-10-CM

## 2021-04-19 DIAGNOSIS — M25.561 ACUTE PAIN OF RIGHT KNEE: ICD-10-CM

## 2021-04-19 PROCEDURE — 73564 X-RAY EXAM KNEE 4 OR MORE: CPT

## 2021-04-19 PROCEDURE — 99213 OFFICE O/P EST LOW 20 MIN: CPT | Performed by: NURSE PRACTITIONER

## 2021-04-19 NOTE — PATIENT INSTRUCTIONS
Knee Pain   WHAT YOU NEED TO KNOW:   Knee pain may start suddenly, or it may be a long-term problem  You may have pain on the side, front, or back of your knee  You may have knee stiffness and swelling  You may hear popping sounds or feel like your knee is giving way or locking up as you walk  You may feel pain when you sit, stand, walk, or climb up and down stairs  Knee pain can be caused by conditions such as obesity, inflammation, or strains or tears in ligaments or tendons  DISCHARGE INSTRUCTIONS:   Return to the emergency department if:   · Your pain is worse, even after treatment  · You cannot bend or straighten your leg completely  · The swelling around your knee does not go down even with treatment  · Your knee is painful and hot to the touch  Contact your healthcare provider if:   · You have questions or concerns about your condition or care  Medicines: You may need any of the following:  · NSAIDs  help decrease swelling and pain or fever  This medicine is available with or without a doctor's order  NSAIDs can cause stomach bleeding or kidney problems in certain people  If you take blood thinner medicine, always ask your healthcare provider if NSAIDs are safe for you  Always read the medicine label and follow directions  · Acetaminophen  decreases pain and fever  It is available without a doctor's order  Ask how much to take and how often to take it  Follow directions  Read the labels of all other medicines you are using to see if they also contain acetaminophen, or ask your doctor or pharmacist  Acetaminophen can cause liver damage if not taken correctly  Do not use more than 4 grams (4,000 milligrams) total of acetaminophen in one day  · Prescription pain medicine  may be given  Ask your healthcare provider how to take this medicine safely  Some prescription pain medicines contain acetaminophen   Do not take other medicines that contain acetaminophen without talking to your healthcare provider  Too much acetaminophen may cause liver damage  Prescription pain medicine may cause constipation  Ask your healthcare provider how to prevent or treat constipation  · Take your medicine as directed  Contact your healthcare provider if you think your medicine is not helping or if you have side effects  Tell him or her if you are allergic to any medicine  Keep a list of the medicines, vitamins, and herbs you take  Include the amounts, and when and why you take them  Bring the list or the pill bottles to follow-up visits  Carry your medicine list with you in case of an emergency  What you can do to manage your symptoms:   · Rest your knee so it can heal   Limit activities that increase your pain  Do low-impact exercises, such as walking or swimming  · Apply ice to help reduce swelling and pain  Use an ice pack, or put crushed ice in a plastic bag  Cover it with a towel before you apply it to your knee  Apply ice for 15 to 20 minutes every hour, or as directed  · Apply compression to help reduce swelling  Use a brace or bandage only as directed  · Elevate your knee to help decrease pain and swelling  Elevate your knee while you are sitting or lying down  Prop your leg on pillows to keep your knee above the level of your heart  · Prevent your knee from moving as directed  Your healthcare provider may put on a cast or splint  You may need to wear a leg brace to stabilize your knee  A leg brace can be adjusted to increase your range of motion as your knee heals  What you can do to prevent knee pain:   · Maintain a healthy weight  Extra weight increases your risk for knee pain  Ask your healthcare provider how much you should weigh  He or she can help you create a safe weight loss plan if you need to lose weight  · Exercise or train properly  Use the correct equipment for sports  Wear shoes that provide good support   Check your posture often as you exercise, play sports, or train for an event  This can help prevent stress and strain on your knees  Rest between sessions so you do not overwork your knees  Follow up with your healthcare provider within 24 hours or as directed: You may need follow-up treatments, such as steroid injections to decrease pain  Write down your questions so you remember to ask them during your visits  © Copyright [address] Information is for End User's use only and may not be sold, redistributed or otherwise used for commercial purposes  All illustrations and images included in CareNotes® are the copyrighted property of [employer]  or [facility] Gala Flores   The above information is an  only  It is not intended as medical advice for individual conditions or treatments  Talk to your doctor, nurse or pharmacist before following any medical regimen to see if it is safe and effective for you

## 2021-04-19 NOTE — PROGRESS NOTES
[address] [facility] [facility]        NAME: Glen Carr is a 38 y o  female  : 1980    MRN: [id number]  DATE: 2021  TIME: 1:02 PM    Assessment and Plan   Acute pain of right knee [M25 561]  1  Acute pain of right knee  XR knee 4+ vw right injury         Patient Instructions     X-rays of the R knee - resulting pending  Aleve or ibuprofen prn  Recommend additional testing via PCP;  if appropriate to r/o fluid in the knee or problems with ligaments/tendons  Follow up with PCP in 3-5 days  Proceed to  ER if symptoms worsen  Chief Complaint     Chief Complaint   Patient presents with    Knee Pain     right x month         History of Present Illness       HPI   Reports right knee pain x 2 months  Denies trauma  No fall  Denies Hx of previous knee pain  No radiation of pain  Rates at 5/10  Achy pain  Worse with full flexion and when exercising  Got worse a few days ago while at massage, when the massage staff was bending the knee for this patient  Review of Systems   Review of Systems   Constitutional: Negative for chills and fever  Respiratory: Negative for cough and shortness of breath  Cardiovascular: Negative for chest pain  Gastrointestinal: Negative for nausea and vomiting  Musculoskeletal: Positive for arthralgias (right knee), gait problem (d/t knee pain) and joint swelling  Skin: Negative for rash and wound  Neurological: Negative for weakness and numbness           Current Medications       Current Outpatient Medications:     CALCIUM CARBONATE-VITAMIN D PO, Take 1 tablet by mouth, Disp: , Rfl:     Cholecalciferol 1000 units capsule, , Disp: , Rfl:     escitalopram (LEXAPRO) 10 mg tablet, TAKE ONE TABLET BY MOUTH EVERY DAY, Disp: 30 tablet, Rfl: 5    multivitamin (THERAGRAN) TABS, Take 1 tablet by mouth, Disp: , Rfl:     Omega-3 Fatty Acids (FISH OIL PO), Take 2 g by mouth, Disp: , Rfl:     zolpidem (AMBIEN) 5 mg tablet, TAKE ONE TABLET BY MOUTH AT BEDTIME AS NEEDED FOR SLEEP, Disp: 30 tablet, Rfl: 5  No current facility-administered medications for this visit  Facility-Administered Medications Ordered in Other Visits:     lidocaine-epinephrine (XYLOCAINE/EPINEPHRINE) 1 %-1:100,000 20 mL, sodium bicarbonate 2 mEq infiltration, , Infiltration, Once, Leo Young MD    Current Allergies     Allergies as of 04/19/2021    (No Known Allergies)            The following portions of the patient's history were reviewed and updated as appropriate: allergies, current medications, past family history, past medical history, past social history, past surgical history and problem list      Past Medical History:   Diagnosis Date    Asthma     Last Assessed 08Jun2012       Past Surgical History:   Procedure Laterality Date    OOPHORECTOMY N/A     Lateral       Family History   Problem Relation Age of Onset    Depression Mother     Alcohol abuse Father     Kidney disease Father     Cataracts Maternal Grandmother     Hyperlipidemia Maternal Grandmother     Diabetes Maternal Grandfather          Medications have been verified  Objective   /54   Pulse (!) 107   Temp (!) 97 2 °F (36 2 °C)   Resp 18   LMP 03/22/2021   SpO2 97%   Patient's last menstrual period was 03/22/2021  Physical Exam     Physical Exam  Musculoskeletal:         General: Swelling (right knee, both medial and lateral aspects, mild) and tenderness (with palpation of the R knee) present  Skin:     Coloration: Skin is not jaundiced  Findings: No bruising, erythema or lesion  Neurological:      Mental Status: She is alert  Motor: No weakness  Gait: Gait abnormal (limps, favoring the R knee)

## 2021-04-21 ENCOUNTER — OFFICE VISIT (OUTPATIENT)
Dept: FAMILY MEDICINE CLINIC | Facility: CLINIC | Age: 41
End: 2021-04-21
Payer: COMMERCIAL

## 2021-04-21 VITALS
HEART RATE: 79 BPM | WEIGHT: 144.6 LBS | DIASTOLIC BLOOD PRESSURE: 62 MMHG | SYSTOLIC BLOOD PRESSURE: 106 MMHG | OXYGEN SATURATION: 99 % | BODY MASS INDEX: 25.61 KG/M2 | RESPIRATION RATE: 16 BRPM | TEMPERATURE: 98.9 F

## 2021-04-21 DIAGNOSIS — M25.561 ACUTE PAIN OF RIGHT KNEE: Primary | ICD-10-CM

## 2021-04-21 PROCEDURE — 99213 OFFICE O/P EST LOW 20 MIN: CPT | Performed by: FAMILY MEDICINE

## 2021-04-21 NOTE — PROGRESS NOTES
FAMILY PRACTICE OFFICE VISIT       NAME: Ty Henry  AGE: 38 y o  SEX: female       : 1980        MRN: [id number]    DATE: 2021  TIME: 5:35 PM    Assessment and Plan   1  Acute pain of right knee  Comments:  Pt stable - knee overall stable on exam  Ortho referral placed  OTC NSAIDs PRN with food, cold therapy, elevation, knee sleeve  Disc modification of activities  Orders:  -     Ambulatory referral to Orthopedic Surgery; Future         There are no Patient Instructions on file for this visit  Chief Complaint   No chief complaint on file  History of Present Illness   Ty Henry is a 38y o -year-old female who c/o right knee pain x 2 weeks  No clear inciting event  She had a massage - felt some discomfort when the person bent her knee  No hx of prior injury or surgery  Did go to the urgent care - no acute findings on xrays  Review of Systems   Review of Systems   Constitutional: Negative for activity change and fever  Musculoskeletal: Positive for arthralgias         Active Problem List     Patient Active Problem List   Diagnosis    Anxiety    Situational stress    Psychophysiological insomnia    Loose stools         Past Medical History:  Past Medical History:   Diagnosis Date    Asthma     Last Assessed 2012       Past Surgical History:  Past Surgical History:   Procedure Laterality Date    OOPHORECTOMY N/A     Lateral       Family History:  Family History   Problem Relation Age of Onset    Depression Mother     Alcohol abuse Father     Kidney disease Father     Cataracts Maternal Grandmother     Hyperlipidemia Maternal Grandmother     Diabetes Maternal Grandfather        Social History:  Social History     Socioeconomic History    Marital status: Single     Spouse name: Not on file    Number of children: Not on file    Years of education: Not on file    Highest education level: Not on file   Occupational History    Not on file Social Needs    Financial resource strain: Not on file    Food insecurity     Worry: Not on file     Inability: Not on file    Transportation needs     Medical: Not on file     Non-medical: Not on file   Tobacco Use    Smoking status: Never Smoker    Smokeless tobacco: Never Used   Substance and Sexual Activity    Alcohol use: Yes     Comment: social    Drug use: No    Sexual activity: Yes   Lifestyle    Physical activity     Days per week: Not on file     Minutes per session: Not on file    Stress: Not on file   Relationships    Social connections     Talks on phone: Not on file     Gets together: Not on file     Attends [employer] service: Not on file     Active member of club or organization: Not on file     Attends meetings of clubs or organizations: Not on file     Relationship status: Not on file    Intimate partner violence     Fear of current or ex partner: Not on file     Emotionally abused: Not on file     Physically abused: Not on file     Forced sexual activity: Not on file   Other Topics Concern    Not on file   Social History Narrative    Drinks coffee       Objective     Vitals:    04/21/21 1403   BP: 106/62   Pulse: 79   Resp: 16   Temp: 98 9 °F (37 2 °C)   SpO2: 99%     Wt Readings from Last 3 Encounters:   04/21/21 65 6 kg (144 lb 9 6 oz)   10/28/20 69 3 kg (152 lb 12 8 oz)   01/10/20 66 2 kg (146 lb)       Physical Exam  Vitals signs and nursing note reviewed  Constitutional:       General: She is not in acute distress  Appearance: Normal appearance  She is not ill-appearing, toxic-appearing or diaphoretic  HENT:      Head: Normocephalic and atraumatic  Musculoskeletal:      Right knee: She exhibits swelling  She exhibits normal range of motion, no erythema, no LCL laxity, normal patellar mobility, normal meniscus and no MCL laxity  Legs:    Neurological:      Mental Status: She is alert and oriented to person, place, and time     Psychiatric:         Mood and Affect: Mood normal          Behavior: Behavior normal          Thought Content:  Thought content normal          Judgment: Judgment normal          Pertinent Laboratory/Diagnostic Studies:  Lab Results   Component Value Date    GLUCOSE 88 04/19/2014    BUN 13 04/19/2014    CREATININE 0 73 04/19/2014    CALCIUM 9 2 04/19/2014     (L) 04/19/2014    K 3 9 04/19/2014    CO2 27 04/19/2014     04/19/2014     Lab Results   Component Value Date    ALT 21 04/19/2014    AST 13 04/19/2014    ALKPHOS 75 04/19/2014    BILITOT 0 35 04/19/2014       Lab Results   Component Value Date    WBC 5 95 04/19/2014    HGB 11 3 (L) 04/19/2014    HCT 33 3 (L) 04/19/2014    MCV 87 04/19/2014     04/19/2014       No results found for: TSH    Lab Results   Component Value Date    CHOL 162 08/09/2015     Lab Results   Component Value Date    TRIG 79 08/09/2015     Lab Results   Component Value Date    HDL 82 08/09/2015     Lab Results   Component Value Date    LDLCALC 64 08/09/2015     Lab Results   Component Value Date    HGBA1C 5 6 08/09/2015       Results for orders placed or performed in visit on 01/23/21   Magnesium   Result Value Ref Range    Magnesium 2 2 1 6 - 2 6 mg/dL   Vitamin D 25 hydroxy   Result Value Ref Range    Vit D, 25-Hydroxy 22 4 (L) 30 0 - 100 0 ng/mL       Orders Placed This Encounter   Procedures    Ambulatory referral to Orthopedic Surgery       ALLERGIES:  No Known Allergies    Current Medications     Current Outpatient Medications   Medication Sig Dispense Refill    CALCIUM CARBONATE-VITAMIN D PO Take 1 tablet by mouth      Cholecalciferol 1000 units capsule       multivitamin (THERAGRAN) TABS Take 1 tablet by mouth      zolpidem (AMBIEN) 5 mg tablet TAKE ONE TABLET BY MOUTH AT BEDTIME AS NEEDED FOR SLEEP 30 tablet 5    escitalopram (LEXAPRO) 10 mg tablet TAKE ONE TABLET BY MOUTH EVERY DAY (Patient not taking: Reported on 4/21/2021) 30 tablet 5    Omega-3 Fatty Acids (FISH OIL PO) Take 2 g by mouth No current facility-administered medications for this visit        Facility-Administered Medications Ordered in Other Visits   Medication Dose Route Frequency Provider Last Rate Last Admin    lidocaine-epinephrine (XYLOCAINE/EPINEPHRINE) 1 %-1:100,000 20 mL, sodium bicarbonate 2 mEq infiltration   Infiltration Once Walt Friday, MD             Health Maintenance     Health Maintenance   Topic Date Due    Annual Physical  Never done    Depression Screening PHQ  01/10/2021    BMI: Followup Plan  01/13/2021    COVID-19 Vaccine (2 - Moderna 2-dose series) 02/22/2021    Influenza Vaccine (1) 06/30/2021 (Originally 9/1/2020)    DTaP,Tdap,and Td Vaccines (1 - Tdap) 04/21/2022 (Originally 4/26/2000)    OT PLAN OF CARE  04/21/2022 (Originally 12/12/2020)    Cervical Cancer Screening  10/06/2021    BMI: Adult  04/21/2022    HIV Screening  Completed    Hepatitis C Screening  Completed    Pneumococcal Vaccine: Pediatrics (0 to 5 Years) and At-Risk Patients (6 to 59 Years)  Aged Out    HIB Vaccine  Aged Out    Hepatitis B Vaccine  Aged Out    IPV Vaccine  Aged Out    Hepatitis A Vaccine  Aged Out    Meningococcal ACWY Vaccine  Aged Out    HPV Vaccine  Aged [employer] History   Administered Date(s) Administered    SARS-CoV-2 / COVID-19 mRNA IM ([facility]) 01/25/2021          Michael [address] Cee Cameron DO

## 2021-04-22 ENCOUNTER — OFFICE VISIT (OUTPATIENT)
Dept: OBGYN CLINIC | Facility: HOSPITAL | Age: 41
End: 2021-04-22
Payer: COMMERCIAL

## 2021-04-22 ENCOUNTER — TELEPHONE (OUTPATIENT)
Dept: FAMILY MEDICINE CLINIC | Facility: CLINIC | Age: 41
End: 2021-04-22

## 2021-04-22 VITALS
HEIGHT: 63 IN | SYSTOLIC BLOOD PRESSURE: 110 MMHG | HEART RATE: 65 BPM | BODY MASS INDEX: 25.87 KG/M2 | DIASTOLIC BLOOD PRESSURE: 70 MMHG | WEIGHT: 146 LBS

## 2021-04-22 DIAGNOSIS — B37.3 VULVOVAGINAL CANDIDIASIS: Primary | ICD-10-CM

## 2021-04-22 DIAGNOSIS — M17.11 PRIMARY OSTEOARTHRITIS OF RIGHT KNEE: ICD-10-CM

## 2021-04-22 DIAGNOSIS — M25.561 ACUTE PAIN OF RIGHT KNEE: Primary | ICD-10-CM

## 2021-04-22 PROCEDURE — 99213 OFFICE O/P EST LOW 20 MIN: CPT | Performed by: ORTHOPAEDIC SURGERY

## 2021-04-22 RX ORDER — FLUCONAZOLE 150 MG/1
150 TABLET ORAL ONCE
Qty: 1 TABLET | Refills: 0 | Status: SHIPPED | OUTPATIENT
Start: 2021-04-22 | End: 2021-04-22

## 2021-04-22 NOTE — TELEPHONE ENCOUNTER
Pt called re: she has symptoms of a yeast infection  Requesting an Rx to treat       Pharmacy:   [id number] [facility], [address] [place] [address]  Phone: [phone] Fax: [phone]

## 2021-04-22 NOTE — PROGRESS NOTES
Assessment:  1  Acute pain of right knee  Ambulatory referral to Orthopedic Surgery    Brace    meloxicam (MOBIC) 7 5 mg tablet    Pt stable - knee overall stable on exam  Ortho referral placed  OTC NSAIDs PRN with food, cold therapy, elevation, knee sleeve  Disc modification of activities  2  Primary osteoarthritis of right knee  Brace    meloxicam (MOBIC) 7 5 mg tablet       Plan:  Acute right knee pain, improved  · Meloxicam 7 5mg daily prescribed   · Right knee hinged brace provided  · Consider physical therapy if pain persists  · Follow up in 4 week    To do next visit:  Return in about 4 weeks (around 5/20/2021)  The above stated was discussed in layman's terms and the patient expressed understanding  All questions were answered to the patient's satisfaction  Scribe Attestation    I,:  Nicole Colindres am acting as a scribe while in the presence of the attending physician :       I,:  Taran Johnson MD personally performed the services described in this documentation    as scribed in my presence :             Subjective:   Beata Braswell is a 38 y o  female who presents for initial evaluation of right knee  She has had symptoms for 2 months with no injury  Today she complains of anteromedial and lateral knee pain  She denies mechanical symptoms  She rates her pain at 0/10 and 10/10 at its worse  Deep flexion aggravates while ice and elevation alleviates  She has used IBU with benefit  She denies past injections or surgery of the knee          Review of systems negative unless otherwise specified in HPI    Past Medical History:   Diagnosis Date    Asthma     Last Assessed 08Jun2012       Past Surgical History:   Procedure Laterality Date    OOPHORECTOMY N/A     Lateral       Family History   Problem Relation Age of Onset    Depression Mother     Alcohol abuse Father     Kidney disease Father     Cataracts Maternal Grandmother     Hyperlipidemia Maternal Grandmother     Diabetes Maternal Grandfather        Social History     Occupational History    Not on file   Tobacco Use    Smoking status: Never Smoker    Smokeless tobacco: Never Used   Substance and Sexual Activity    Alcohol use: Yes     Comment: social    Drug use: No    Sexual activity: Yes         Current Outpatient Medications:     CALCIUM CARBONATE-VITAMIN D PO, Take 1 tablet by mouth, Disp: , Rfl:     Cholecalciferol 1000 units capsule, , Disp: , Rfl:     multivitamin (THERAGRAN) TABS, Take 1 tablet by mouth, Disp: , Rfl:     Omega-3 Fatty Acids (FISH OIL PO), Take 2 g by mouth, Disp: , Rfl:     zolpidem (AMBIEN) 5 mg tablet, TAKE ONE TABLET BY MOUTH AT BEDTIME AS NEEDED FOR SLEEP, Disp: 30 tablet, Rfl: 5    escitalopram (LEXAPRO) 10 mg tablet, TAKE ONE TABLET BY MOUTH EVERY DAY (Patient not taking: Reported on 4/22/2021), Disp: 30 tablet, Rfl: 5    fluconazole (DIFLUCAN) 150 mg tablet, Take 1 tablet (150 mg total) by mouth once for 1 dose, Disp: 1 tablet, Rfl: 0  No current facility-administered medications for this visit  Facility-Administered Medications Ordered in Other Visits:     lidocaine-epinephrine (XYLOCAINE/EPINEPHRINE) 1 %-1:100,000 20 mL, sodium bicarbonate 2 mEq infiltration, , Infiltration, Once, Jalil Mcdonald MD    No Known Allergies         Vitals:    04/22/21 0913   BP: 110/70   Pulse: 65       Objective:  Physical exam  · General: Awake, Alert, Oriented  · Eyes: Pupils equal, round and reactive to light  · Heart: extremities perfused, no audible murmurs  · Lungs: No audible wheezing                      Ortho Exam  Right knee:  No erythema or ecchymosis  No effusion or swelling  AROM without pain   Negative McMurrays  Calf compartments soft and supple  Sensation intact  Toes are warm sensate and mobile        Diagnostics, reviewed and taken today if performed as documented:     The attending physician has personally reviewed the pertinent films in PACS and interpretation is as follows:  Right knee x-ray:  Mild arthritic changes  Procedures, if performed today:    Procedures    None performed      Portions of the record may have been created with voice recognition software  Occasional wrong word or "sound a like" substitutions may have occurred due to the inherent limitations of voice recognition software  Read the chart carefully and recognize, using context, where substitutions have occurred

## 2021-04-23 PROBLEM — M25.561 ACUTE PAIN OF RIGHT KNEE: Status: ACTIVE | Noted: 2021-04-23

## 2021-04-23 RX ORDER — MELOXICAM 7.5 MG/1
7.5 TABLET ORAL DAILY
Qty: 30 TABLET | Refills: 1 | Status: SHIPPED | OUTPATIENT
Start: 2021-04-23 | End: 2022-01-28

## 2021-05-17 ENCOUNTER — TELEPHONE (OUTPATIENT)
Dept: OBGYN CLINIC | Facility: HOSPITAL | Age: 41
End: 2021-05-17

## 2021-07-23 ENCOUNTER — OFFICE VISIT (OUTPATIENT)
Dept: FAMILY MEDICINE CLINIC | Facility: CLINIC | Age: 41
End: 2021-07-23
Payer: COMMERCIAL

## 2021-07-23 VITALS
SYSTOLIC BLOOD PRESSURE: 100 MMHG | BODY MASS INDEX: 24.45 KG/M2 | DIASTOLIC BLOOD PRESSURE: 70 MMHG | OXYGEN SATURATION: 99 % | HEIGHT: 63 IN | RESPIRATION RATE: 14 BRPM | TEMPERATURE: 98.4 F | WEIGHT: 138 LBS | HEART RATE: 74 BPM

## 2021-07-23 DIAGNOSIS — F51.04 PSYCHOPHYSIOLOGICAL INSOMNIA: Primary | ICD-10-CM

## 2021-07-23 PROCEDURE — 3008F BODY MASS INDEX DOCD: CPT | Performed by: FAMILY MEDICINE

## 2021-07-23 PROCEDURE — 99213 OFFICE O/P EST LOW 20 MIN: CPT | Performed by: FAMILY MEDICINE

## 2021-07-23 RX ORDER — ASCORBIC ACID 500 MG
500 TABLET ORAL DAILY
COMMUNITY

## 2021-07-23 RX ORDER — ESZOPICLONE 2 MG/1
2 TABLET, FILM COATED ORAL
Qty: 30 TABLET | Refills: 2 | Status: SHIPPED | OUTPATIENT
Start: 2021-07-23 | End: 2021-11-09 | Stop reason: SDUPTHER

## 2021-07-23 NOTE — PROGRESS NOTES
FAMILY PRACTICE OFFICE VISIT       NAME: Bernadette Babcock  AGE: 42 y o  SEX: female       : 1980        MRN: [id number]    DATE: 2021  TIME: 10:43 AM    Assessment and Plan   1  Psychophysiological insomnia  Comments:  Pt stable - discussed options for tx at length, potential R/SE/precautions, etc   Will start her on Lunesta 2mg QHS PRN at this time  Orders:  -     eszopiclone (LUNESTA) 2 mg tablet; Take 1 tablet (2 mg total) by mouth daily at bedtime as needed for sleep Take immediately before bedtime         Patient Instructions   Due for physical           Chief Complaint     Chief Complaint   Patient presents with    Insomnia     patient being seen due to sleep medication not working  History of Present Illness   Silva Adam is a 42y o -year-old female who presents today for issues with her sleeping  Worries at night - formerly took Escitalopram; not interested in restarting  Ambien just never seemed to help her much  No hx of sleep walking  Not currently pregnant  [place] PDMP was checked, and appropriate  Review of Systems   Review of Systems   Constitutional: Negative for activity change  Psychiatric/Behavioral: Positive for sleep disturbance  The patient is nervous/anxious  Anxiety/worry at night intermittently, leading to sleep issues         Active Problem List     Patient Active Problem List   Diagnosis    Anxiety    Situational stress    Psychophysiological insomnia    Loose stools    Acute pain of right knee         Past Medical History:  Past Medical History:   Diagnosis Date    Asthma     Last Assessed 2012       Past Surgical History:  Past Surgical History:   Procedure Laterality Date    OOPHORECTOMY N/A     Lateral       Family History:  Family History   Problem Relation Age of Onset    Depression Mother     Alcohol abuse Father     Kidney disease Father     Cataracts Maternal Grandmother     Hyperlipidemia Maternal Grandmother     Diabetes Maternal Grandfather        Social History:  Social History     Socioeconomic History    Marital status: Single     Spouse name: Not on file    Number of children: Not on file    Years of education: Not on file    Highest education level: Not on file   Occupational History    Not on file   Tobacco Use    Smoking status: Never Smoker    Smokeless tobacco: Never Used   Vaping Use    Vaping Use: Never used   Substance and Sexual Activity    Alcohol use: Yes     Comment: social    Drug use: No    Sexual activity: Yes   Other Topics Concern    Not on file   Social History Narrative    Drinks coffee     Social Determinants of Health     Financial Resource Strain:     Difficulty of Paying Living Expenses:    Food Insecurity:     Worried About Running Out of Food in the Last Year:     [address] in the Last Year:    Transportation Needs:     Lack of Transportation (Medical):  Lack of Transportation (Non-Medical):    Physical Activity:     Days of Exercise per Week:     Minutes of Exercise per Session:    Stress:     Feeling of Stress :    Social Connections:     Frequency of Communication with Friends and Family:     Frequency of Social Gatherings with Friends and Family:     Attends Rastaf[employer] Services:     Active Member of Clubs or Organizations:     Attends Club or Organization Meetings:     Marital Status:    Intimate Partner Violence:     Fear of Current or Ex-Partner:     Emotionally Abused:     Physically Abused:     Sexually Abused:        Objective     Vitals:    07/23/21 1027   BP: 100/70   Pulse: 74   Resp: 14   Temp: 98 4 °F (36 9 °C)   SpO2: 99%     Wt Readings from Last 3 Encounters:   07/23/21 62 6 kg (138 lb)   04/22/21 66 2 kg (146 lb)   04/21/21 65 6 kg (144 lb 9 6 oz)       Physical Exam  Vitals and nursing note reviewed  Constitutional:       General: She is not in acute distress  Appearance: Normal appearance   She is not ill-appearing, toxic-appearing or diaphoretic  HENT:      Head: Normocephalic and atraumatic  Eyes:      General: No scleral icterus  Conjunctiva/sclera: Conjunctivae normal    Neurological:      Mental Status: She is alert and oriented to person, place, and time  Psychiatric:         Mood and Affect: Mood normal          Behavior: Behavior normal          Thought Content: Thought content normal          Judgment: Judgment normal          Pertinent Laboratory/Diagnostic Studies:  Lab Results   Component Value Date    GLUCOSE 88 04/19/2014    BUN 13 04/19/2014    CREATININE 0 73 04/19/2014    CALCIUM 9 2 04/19/2014     (L) 04/19/2014    K 3 9 04/19/2014    CO2 27 04/19/2014     04/19/2014     Lab Results   Component Value Date    ALT 21 04/19/2014    AST 13 04/19/2014    ALKPHOS 75 04/19/2014    BILITOT 0 35 04/19/2014       Lab Results   Component Value Date    WBC 5 95 04/19/2014    HGB 11 3 (L) 04/19/2014    HCT 33 3 (L) 04/19/2014    MCV 87 04/19/2014     04/19/2014       No results found for: TSH    Lab Results   Component Value Date    CHOL 162 08/09/2015     Lab Results   Component Value Date    TRIG 79 08/09/2015     Lab Results   Component Value Date    HDL 82 08/09/2015     Lab Results   Component Value Date    LDLCALC 64 08/09/2015     Lab Results   Component Value Date    HGBA1C 5 6 08/09/2015       Results for orders placed or performed in visit on 01/23/21   Magnesium   Result Value Ref Range    Magnesium 2 2 1 6 - 2 6 mg/dL   Vitamin D 25 hydroxy   Result Value Ref Range    Vit D, 25-Hydroxy 22 4 (L) 30 0 - 100 0 ng/mL       No orders of the defined types were placed in this encounter        ALLERGIES:  No Known Allergies    Current Medications     Current Outpatient Medications   Medication Sig Dispense Refill    CALCIUM CARBONATE-VITAMIN D PO Take 1 tablet by mouth      Cholecalciferol 1000 units capsule       multivitamin (THERAGRAN) TABS Take 1 tablet by mouth      Omega-3 Fatty Acids (FISH OIL PO) Take 2 g by mouth      ascorbic acid (VITAMIN C) 500 MG tablet Take 500 mg by mouth daily      eszopiclone (LUNESTA) 2 mg tablet Take 1 tablet (2 mg total) by mouth daily at bedtime as needed for sleep Take immediately before bedtime 30 tablet 2    meloxicam (MOBIC) 7 5 mg tablet Take 1 tablet (7 5 mg total) by mouth daily (Patient not taking: Reported on 7/23/2021) 30 tablet 1     No current facility-administered medications for this visit       Facility-Administered Medications Ordered in Other Visits   Medication Dose Route Frequency Provider Last Rate Last Admin    lidocaine-epinephrine (XYLOCAINE/EPINEPHRINE) 1 %-1:100,000 20 mL, sodium bicarbonate 2 mEq infiltration   Infiltration Once Nae Lamar MD             Health Maintenance     Health Maintenance   Topic Date Due    Annual Physical  Never done    Influenza Vaccine (1) 09/01/2021    DTaP,Tdap,and Td Vaccines (1 - Tdap) 04/21/2022 (Originally 4/26/2000)    OT PLAN OF CARE  04/21/2022 (Originally 12/12/2020)    Cervical Cancer Screening  10/06/2021    Depression Screening PHQ  04/21/2022    BMI: Adult  07/23/2022    HIV Screening  Completed    Hepatitis C Screening  Completed    COVID-19 Vaccine  Completed    Pneumococcal Vaccine: Pediatrics (0 to 5 Years) and At-Risk Patients (6 to 59 Years)  Aged Out    HIB Vaccine  Aged Out    Hepatitis B Vaccine  Aged Out    IPV Vaccine  Aged Out    Hepatitis A Vaccine  Aged Out    Meningococcal ACWY Vaccine  Aged Out    HPV Vaccine  Aged [employer] History   Administered Date(s) Administered    SARS-CoV-2 / COVID-19 mRNA IM ([facility]) 01/25/2021, 05/18/2021          Michael Cameron DO

## 2021-07-28 ENCOUNTER — TELEPHONE (OUTPATIENT)
Dept: FAMILY MEDICINE CLINIC | Facility: CLINIC | Age: 41
End: 2021-07-28

## 2021-07-28 DIAGNOSIS — Z12.31 ENCOUNTER FOR SCREENING MAMMOGRAM FOR MALIGNANT NEOPLASM OF BREAST: Primary | ICD-10-CM

## 2021-07-28 NOTE — TELEPHONE ENCOUNTER
Patient called and stated that she wanted to do a mammogram, family history of breast cancer (grandmother)  Please advise

## 2021-08-20 ENCOUNTER — TELEPHONE (OUTPATIENT)
Dept: FAMILY MEDICINE CLINIC | Facility: CLINIC | Age: 41
End: 2021-08-20

## 2021-08-20 NOTE — TELEPHONE ENCOUNTER
Pt called central scheduling to schedule her mammo  Pt stated that she gets sharp breast pain in left breast, periodically  They would not schedule her and requests a new order for a different breast exam  Please advise

## 2021-08-20 NOTE — TELEPHONE ENCOUNTER
They didn't suggest any other order that should be placed  They just informed her that she couldn't be scheduled for the one that was ordered due to having periodic pain in her breasts

## 2021-08-24 NOTE — TELEPHONE ENCOUNTER
Called Pt  Pt stated that they were requesting a diagnostic test but, did not know which they were advising  Advised Pt to contact central scheduling for her annual mammo, as ordered by PCP  If she needs another order, contact the office  Pt stated that she does not need a follow up appt at this time

## 2021-08-25 ENCOUNTER — HOSPITAL ENCOUNTER (OUTPATIENT)
Dept: MAMMOGRAPHY | Facility: MEDICAL CENTER | Age: 41
Discharge: HOME | End: 2021-08-25
Payer: COMMERCIAL

## 2021-08-25 VITALS — HEIGHT: 63 IN | BODY MASS INDEX: 24.45 KG/M2 | WEIGHT: 138 LBS

## 2021-08-25 DIAGNOSIS — Z12.31 ENCOUNTER FOR SCREENING MAMMOGRAM FOR MALIGNANT NEOPLASM OF BREAST: ICD-10-CM

## 2021-08-25 PROCEDURE — 77067 SCR MAMMO BI INCL CAD: CPT

## 2021-08-25 PROCEDURE — 77063 BREAST TOMOSYNTHESIS BI: CPT

## 2021-08-26 ENCOUNTER — OFFICE VISIT (OUTPATIENT)
Dept: OBGYN CLINIC | Facility: CLINIC | Age: 41
End: 2021-08-26

## 2021-08-26 ENCOUNTER — TELEPHONE (OUTPATIENT)
Dept: OBGYN CLINIC | Facility: CLINIC | Age: 41
End: 2021-08-26

## 2021-08-26 VITALS
HEIGHT: 63 IN | BODY MASS INDEX: 25.8 KG/M2 | DIASTOLIC BLOOD PRESSURE: 51 MMHG | HEART RATE: 72 BPM | WEIGHT: 145.6 LBS | SYSTOLIC BLOOD PRESSURE: 113 MMHG

## 2021-08-26 DIAGNOSIS — Z01.419 WOMEN'S ANNUAL ROUTINE GYNECOLOGICAL EXAMINATION: Primary | ICD-10-CM

## 2021-08-26 DIAGNOSIS — Z30.09 UNWANTED FERTILITY: ICD-10-CM

## 2021-08-26 DIAGNOSIS — Z11.3 SCREEN FOR STD (SEXUALLY TRANSMITTED DISEASE): ICD-10-CM

## 2021-08-26 DIAGNOSIS — Z12.4 CERVICAL CANCER SCREENING: ICD-10-CM

## 2021-08-26 PROCEDURE — 87591 N.GONORRHOEAE DNA AMP PROB: CPT | Performed by: NURSE PRACTITIONER

## 2021-08-26 PROCEDURE — 99386 PREV VISIT NEW AGE 40-64: CPT | Performed by: STUDENT IN AN ORGANIZED HEALTH CARE EDUCATION/TRAINING PROGRAM

## 2021-08-26 PROCEDURE — 87491 CHLMYD TRACH DNA AMP PROBE: CPT | Performed by: NURSE PRACTITIONER

## 2021-08-26 PROCEDURE — G0145 SCR C/V CYTO,THINLAYER,RESCR: HCPCS | Performed by: NURSE PRACTITIONER

## 2021-08-26 PROCEDURE — G0476 HPV COMBO ASSAY CA SCREEN: HCPCS | Performed by: NURSE PRACTITIONER

## 2021-08-26 RX ORDER — ETONOGESTREL AND ETHINYL ESTRADIOL 11.7; 2.7 MG/1; MG/1
INSERT, EXTENDED RELEASE VAGINAL
Qty: 1 EACH | Refills: 12 | Status: SHIPPED | OUTPATIENT
Start: 2021-08-26 | End: 2021-09-01

## 2021-08-26 NOTE — PROGRESS NOTES
ANNUAL GYNECOLOGICAL EXAMINATION    Domingo Liu is a 42 y o  female who presents today for annual GYN exam   She is unsure when her last pap smear was performed, probably a few years ago  She report a history of one abnormal pap smear in her 20's, but reports all normal pap smears since  Her last mammogram was performed yesterday and result was BI-RADS 1 on the Right breast, left breast needs additional imaging and a breast ultrasound was scheduled  She did not have the Gardasil vaccine series  She reports menses as normal   Patient's last menstrual period was 2021  Her contraceptive method is condoms  She had been using the Nuvaring until about a year ago when her Rx , she would like to return to this method of contraception  Her general medical history has been reviewed and she reports it as follows:    Past Medical History:   Diagnosis Date    Asthma     Last Assessed 2012     Past Surgical History:   Procedure Laterality Date    OOPHORECTOMY N/A     Lateral     OB History        1    Para   1    Term   1            AB        Living           SAB        TAB        Ectopic        Multiple        Live Births                   Social History     Tobacco Use    Smoking status: Never Smoker    Smokeless tobacco: Never Used   Vaping Use    Vaping Use: Never used   Substance Use Topics    Alcohol use: Yes     Comment: social    Drug use: No     Cancer-related family history includes Breast cancer in her maternal grandmother; Cancer in her maternal uncle  Current Outpatient Medications   Medication Instructions    ascorbic acid (VITAMIN C) 500 mg, Daily    CALCIUM CARBONATE-VITAMIN D PO 1 tablet, Oral    Cholecalciferol 1000 units capsule No dose, route, or frequency recorded      eszopiclone (LUNESTA) 2 mg, Oral, Daily at bedtime PRN, Take immediately before bedtime    meloxicam (MOBIC) 7 5 mg, Oral, Daily    multivitamin (THERAGRAN) TABS 1 tablet, Oral    Omega-3 Fatty Acids (FISH OIL PO) 2 g, Oral       Review of Systems:  Review of Systems   Constitutional: Negative  Gastrointestinal: Negative  Genitourinary: Negative  Skin: Negative  Physical Exam:  /51 (BP Location: Right arm, Patient Position: Sitting, Cuff Size: Adult)   Pulse 72   Ht 5' 3" (1 6 m)   Wt 66 kg (145 lb 9 6 oz)   LMP 08/09/2021   BMI 25 79 kg/m²   Physical Exam  Constitutional:       General: She is not in acute distress  Appearance: Normal appearance  Genitourinary:      Vulva, inguinal canal, urethra, bladder, vagina, cervix, uterus, right adnexa and left adnexa normal    Cardiovascular:      Rate and Rhythm: Normal rate and regular rhythm  Pulmonary:      Effort: Pulmonary effort is normal       Breath sounds: Normal breath sounds  Abdominal:      General: Abdomen is flat  Palpations: Abdomen is soft  Musculoskeletal:      Cervical back: Neck supple  Neurological:      Mental Status: She is alert  Skin:     General: Skin is warm and dry  Psychiatric:         Mood and Affect: Mood normal          Behavior: Behavior normal    Vitals reviewed  Declines breast exam today  Assessment/Plan:   1  Normal well-woman GYN exam   2  Cervical cancer screening:  Normal cervical exam   Pap smear done with HPV co-testing  3  STD screening: Orders placed for vaginal GC/CT cultures  4  Breast cancer screening:  Declined breast exam today  Reviewed breast self-awareness  Will follow up with breast ultrasound due to mammogram results  5  Depression Screening: Patient's depression screening was assessed with a PHQ-2 score of 0   Their PHQ-9 score was 0  Clinically patient does not have depression  No treatment is required  6  BMI Counseling: Body mass index is 25 79 kg/m²  Discussed the patient's BMI with her   The BMI is above normal  Nutrition recommendations include reducing portion sizes, decreasing overall calorie intake and 3-5 servings of fruits/vegetables daily  Exercise recommendations include moderate aerobic physical activity for 150 minutes/week  7  Contraception:  Requesting to return to [address]  Risks/benefits and instructions for use reviewed  8  Discussed Gardasil vaccine, patient declines at this time  9  Return to office in one year for annual exam or PRN  Reviewed with patient that [facility], but that they will not necessarily be reviewed by me immediately  Explained that I will review results at my earliest opportunity and contact patient appropriately

## 2021-08-26 NOTE — TELEPHONE ENCOUNTER
Patient called to notify that the Mike [facility] is going to require a prior authorization  This was verified with [employer]  Please review, thank you

## 2021-08-27 ENCOUNTER — TELEPHONE (OUTPATIENT)
Dept: OBGYN CLINIC | Facility: CLINIC | Age: 41
End: 2021-08-27

## 2021-08-27 LAB
C TRACH DNA SPEC QL NAA+PROBE: NEGATIVE
N GONORRHOEA DNA SPEC QL NAA+PROBE: NEGATIVE

## 2021-08-28 LAB
HPV HR 12 DNA CVX QL NAA+PROBE: NEGATIVE
HPV16 DNA CVX QL NAA+PROBE: NEGATIVE
HPV18 DNA CVX QL NAA+PROBE: NEGATIVE

## 2021-08-31 ENCOUNTER — HOSPITAL ENCOUNTER (OUTPATIENT)
Dept: ULTRASOUND IMAGING | Facility: CLINIC | Age: 41
Discharge: HOME | End: 2021-08-31
Payer: COMMERCIAL

## 2021-08-31 VITALS — HEIGHT: 63 IN | BODY MASS INDEX: 25.69 KG/M2 | WEIGHT: 145 LBS

## 2021-08-31 DIAGNOSIS — R92.8 ABNORMAL SCREENING MAMMOGRAM: ICD-10-CM

## 2021-08-31 PROCEDURE — 76642 ULTRASOUND BREAST LIMITED: CPT

## 2021-09-01 ENCOUNTER — TELEPHONE (OUTPATIENT)
Dept: OBGYN CLINIC | Facility: CLINIC | Age: 41
End: 2021-09-01

## 2021-09-01 DIAGNOSIS — Z30.09 UNWANTED FERTILITY: Primary | ICD-10-CM

## 2021-09-01 LAB
LAB AP GYN PRIMARY INTERPRETATION: NORMAL
Lab: NORMAL

## 2021-09-01 RX ORDER — ETONOGESTREL AND ETHINYL ESTRADIOL 11.7; 2.7 MG/1; MG/1
INSERT, EXTENDED RELEASE VAGINAL
Qty: 1 EACH | Refills: 12 | Status: SHIPPED | OUTPATIENT
Start: 2021-09-01 | End: 2022-02-09

## 2021-09-01 NOTE — TELEPHONE ENCOUNTER
Left detailed message for pt to call us back for her pap & STI results which were both negative      Office number provided

## 2021-09-01 NOTE — TELEPHONE ENCOUNTER
----- Message from Kalyani Roblero, [address] sent at 9/1/2021  1:04 PM EDT -----  Please let her know that her pap and STI screening is negative  Thank you!

## 2021-09-01 NOTE — TELEPHONE ENCOUNTER
Call placed to patient to inform her that per her insurance plan Nuvaring is not the preferred medication  Eluryng is preferred and does not require prior auth  Message sent to provider to have prescription changed to [facility] Kamran JOSEPH

## 2021-09-05 NOTE — RESULT ENCOUNTER NOTE
Please let the patient know that their Left Breast US showed a cyst and likely benign growth (fibroadenoma) in the left breast - Radiology has recommended, and it has already been ordered, a repeat left breast US in 6 months  Thanks     Dr Vitaliy Reynolds

## 2021-10-07 ENCOUNTER — TELEPHONE (OUTPATIENT)
Dept: GENETICS | Facility: CLINIC | Age: 41
End: 2021-10-07

## 2021-11-09 DIAGNOSIS — F51.04 PSYCHOPHYSIOLOGICAL INSOMNIA: ICD-10-CM

## 2021-11-09 RX ORDER — ESZOPICLONE 2 MG/1
2 TABLET, FILM COATED ORAL
Qty: 30 TABLET | Refills: 3 | Status: SHIPPED | OUTPATIENT
Start: 2021-11-09 | End: 2022-02-09 | Stop reason: SDUPTHER

## 2021-11-19 ENCOUNTER — TELEPHONE (OUTPATIENT)
Dept: OTHER | Facility: OTHER | Age: 41
End: 2021-11-19

## 2021-11-22 ENCOUNTER — TELEPHONE (OUTPATIENT)
Dept: SURGICAL ONCOLOGY | Facility: CLINIC | Age: 41
End: 2021-11-22

## 2021-11-30 ENCOUNTER — CLINICAL SUPPORT (OUTPATIENT)
Dept: GENETICS | Facility: CLINIC | Age: 41
End: 2021-11-30

## 2021-11-30 DIAGNOSIS — Z80.3 FAMILY HISTORY OF BREAST CANCER: ICD-10-CM

## 2021-11-30 DIAGNOSIS — Z84.81 FAMILY HISTORY OF GENETIC DISEASE CARRIER: Primary | ICD-10-CM

## 2021-11-30 PROCEDURE — NC001 PR NO CHARGE: Performed by: GENETIC COUNSELOR, MS

## 2021-12-21 ENCOUNTER — TELEPHONE (OUTPATIENT)
Dept: GENETICS | Facility: CLINIC | Age: 41
End: 2021-12-21

## 2021-12-22 ENCOUNTER — TELEPHONE (OUTPATIENT)
Dept: GENETICS | Facility: CLINIC | Age: 41
End: 2021-12-22

## 2021-12-30 ENCOUNTER — TELEPHONE (OUTPATIENT)
Dept: FAMILY MEDICINE CLINIC | Facility: CLINIC | Age: 41
End: 2021-12-30

## 2021-12-30 PROCEDURE — 87636 SARSCOV2 & INF A&B AMP PRB: CPT | Performed by: FAMILY MEDICINE

## 2022-01-27 ENCOUNTER — OFFICE VISIT (OUTPATIENT)
Dept: FAMILY MEDICINE CLINIC | Facility: CLINIC | Age: 42
End: 2022-01-27
Payer: COMMERCIAL

## 2022-01-27 VITALS
BODY MASS INDEX: 26.58 KG/M2 | WEIGHT: 150 LBS | HEART RATE: 77 BPM | HEIGHT: 63 IN | TEMPERATURE: 98.1 F | SYSTOLIC BLOOD PRESSURE: 118 MMHG | DIASTOLIC BLOOD PRESSURE: 72 MMHG | OXYGEN SATURATION: 98 %

## 2022-01-27 DIAGNOSIS — F41.9 ANXIETY: Primary | ICD-10-CM

## 2022-01-27 PROCEDURE — 99213 OFFICE O/P EST LOW 20 MIN: CPT | Performed by: FAMILY MEDICINE

## 2022-01-27 RX ORDER — CITALOPRAM 10 MG/1
10 TABLET ORAL DAILY
Qty: 30 TABLET | Refills: 1 | Status: SHIPPED | OUTPATIENT
Start: 2022-01-27 | End: 2022-02-09 | Stop reason: SDUPTHER

## 2022-01-27 NOTE — PROGRESS NOTES
Subjective   Silva Aguayo is a 42 y o  female who presents for evaluation of anxiety disorder  She has the following anxiety symptoms: difficulty concentrating, insomnia, irritable, panic attacks, psychomotor agitation and racing thoughts  This has been going for several years but worsened in the last 3 weeks  S he was prescribed Lexapro last year but admits she didn't take it  She is interested in medical marijuana and asked about providers in the area  She tried inhaling THC once and felt ill afterwards ( paranoid)  She also saw a theraoist during the pandemic virtually but wasn't helpful  She does have a history of excessive ETOH use but has cut down  Was drinking daily but now drinks 2 bottle of wine a day on the weekends  She lives alone and most of hr family is in [place]  She has a son in the [address] in the [place]  She has thought about moving to [place] but son will soon be relocated  She denies current suicidal and homicidal ideation  The following portions of the patient's history were reviewed and updated as appropriate:   She  has a past medical history of Asthma  She   Patient Active Problem List    Diagnosis Date Noted    Acute pain of right knee 04/23/2021    Loose stools 01/13/2020    Situational stress 11/04/2019    Psychophysiological insomnia 11/04/2019    Anxiety 06/07/2012     She  has a past surgical history that includes Oophorectomy (N/A)  Her family history includes Alcohol abuse in her father; Breast cancer in her maternal grandmother; Cancer in her maternal uncle; Cataracts in her maternal grandmother; Depression in her mother; Diabetes in her maternal grandfather; Hyperlipidemia in her maternal grandmother; Kidney disease in her father; No Known Problems in her maternal aunt, maternal aunt, paternal aunt, paternal aunt, paternal aunt, paternal aunt, paternal grandfather, paternal grandmother, sister, sister, and son  She  reports that she has never smoked  She has never used smokeless tobacco  She reports current alcohol use  She reports that she does not use drugs  Current Outpatient Medications on File Prior to Visit   Medication Sig    CALCIUM CARBONATE-VITAMIN D PO Take 1 tablet by mouth    eszopiclone (LUNESTA) 2 mg tablet Take 1 tablet (2 mg total) by mouth daily at bedtime as needed for sleep Take immediately before bedtime    etonogestrel-ethinyl estradiol (EluRyng) 0 12-0 015 MG/24HR vaginal ring Insert vaginally and leave in place for 3 consecutive weeks, then remove for 1 week   multivitamin (THERAGRAN) TABS Take 1 tablet by mouth    Omega-3 Fatty Acids (FISH OIL PO) Take 2 g by mouth    ascorbic acid (VITAMIN C) 500 MG tablet Take 500 mg by mouth daily (Patient not taking: Reported on 8/26/2021)    [DISCONTINUED] Cholecalciferol 1000 units capsule  (Patient not taking: Reported on 8/26/2021)    [DISCONTINUED] meloxicam (MOBIC) 7 5 mg tablet Take 1 tablet (7 5 mg total) by mouth daily (Patient not taking: Reported on 7/23/2021)     Current Facility-Administered Medications on File Prior to Visit   Medication    lidocaine-epinephrine (XYLOCAINE/EPINEPHRINE) 1 %-1:100,000 20 mL, sodium bicarbonate 2 mEq infiltration     She has No Known Allergies       Review of Systems  Pertinent items are noted in HPI  Objective   Physical Exam  Vitals reviewed  Constitutional:       General: She is not in acute distress  Appearance: Normal appearance  She is not ill-appearing, toxic-appearing or diaphoretic  Comments: Well groomed and pleasant    HENT:      Head: Normocephalic and atraumatic  Neck:      Comments: No thyromegaly or nodules   Cardiovascular:      Rate and Rhythm: Normal rate and regular rhythm  Heart sounds: No murmur heard  Pulmonary:      Effort: Pulmonary effort is normal       Breath sounds: Normal breath sounds  Musculoskeletal:      Cervical back: No tenderness     Lymphadenopathy:      Cervical: No cervical adenopathy  Skin:     General: Skin is warm  Neurological:      Mental Status: She is alert and oriented to person, place, and time  Psychiatric:         Mood and Affect: Mood is anxious  Speech: Speech normal          Behavior: Behavior normal          Thought Content: Thought content normal            Assessment/Plan      Problem List Items Addressed This Visit        Other    Anxiety - Primary     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

## 2022-01-28 NOTE — ASSESSMENT & PLAN NOTE
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

## 2022-02-09 ENCOUNTER — OFFICE VISIT (OUTPATIENT)
Dept: FAMILY MEDICINE CLINIC | Facility: CLINIC | Age: 42
End: 2022-02-09
Payer: COMMERCIAL

## 2022-02-09 VITALS
BODY MASS INDEX: 24.99 KG/M2 | TEMPERATURE: 97.9 F | OXYGEN SATURATION: 99 % | SYSTOLIC BLOOD PRESSURE: 118 MMHG | HEIGHT: 65 IN | RESPIRATION RATE: 16 BRPM | WEIGHT: 150 LBS | HEART RATE: 78 BPM | DIASTOLIC BLOOD PRESSURE: 70 MMHG

## 2022-02-09 DIAGNOSIS — Z92.89 HISTORY OF POSITIVE PPD: ICD-10-CM

## 2022-02-09 DIAGNOSIS — F41.9 ANXIETY: ICD-10-CM

## 2022-02-09 DIAGNOSIS — Z11.1 SCREENING-PULMONARY TB: ICD-10-CM

## 2022-02-09 DIAGNOSIS — Z00.00 ENCOUNTER FOR ANNUAL HEALTH EXAMINATION: Primary | ICD-10-CM

## 2022-02-09 DIAGNOSIS — F51.04 PSYCHOPHYSIOLOGICAL INSOMNIA: ICD-10-CM

## 2022-02-09 PROCEDURE — 99396 PREV VISIT EST AGE 40-64: CPT | Performed by: FAMILY MEDICINE

## 2022-02-09 RX ORDER — ESZOPICLONE 2 MG/1
2 TABLET, FILM COATED ORAL
Qty: 30 TABLET | Refills: 3 | Status: SHIPPED | OUTPATIENT
Start: 2022-02-27 | End: 2022-08-09

## 2022-02-09 RX ORDER — CITALOPRAM 10 MG/1
10 TABLET ORAL DAILY
Qty: 90 TABLET | Refills: 1 | Status: SHIPPED | OUTPATIENT
Start: 2022-02-09 | End: 2022-08-09

## 2022-02-09 NOTE — ASSESSMENT & PLAN NOTE
Improved with Celexa 10 mg daily   She also stopped drinking!! Refills sent to pharmacy   Recommend 6-9 months of treatment before she could consider tapering off

## 2022-02-09 NOTE — ASSESSMENT & PLAN NOTE
Requested by her new employer  PPD in 2008 was positive but CXR did not show active infection  Will order quantiferon gold for screening purposes

## 2022-02-09 NOTE — PROGRESS NOTES
[facility]    NAME: Juancarlos Guerrero  AGE: 42 y o  SEX: female  : 1980     DATE: 2022     Assessment and Plan:     Problem List Items Addressed This Visit        Other    Anxiety     Improved with Celexa 10 mg daily   She also stopped drinking!! Refills sent to pharmacy   Recommend 6-9 months of treatment before she could consider tapering off  Relevant Medications    citalopram (CeleXA) 10 mg tablet    Psychophysiological insomnia     Controlled on Lunesta  PDMP queried   Refills provided          Relevant Medications    eszopiclone (LUNESTA) 2 mg tablet (Start on 2022)    Encounter for annual health examination - Primary     Received the flu vaccine and primary covid series  UTD for pap smears and mammo  Exercises 6 days a weeks and has a well balance diet  No longer drinking!!! Encourage her to abstain from ETOH            Screening-pulmonary TB     Requested by her new employer  PPD in  was positive but CXR did not show active infection  Will order quantiferon gold for screening purposes            Other Visit Diagnoses     History of positive PPD        Relevant Orders    Quantiferon TB Gold Plus          Immunizations and preventive care screenings were discussed with patient today  Appropriate education was printed on patient's after visit summary  Counseling:  Alcohol/drug use: discussed moderation in alcohol intake, the recommendations for healthy alcohol use, and avoidance of illicit drug use  Dental Health: discussed importance of regular tooth brushing, flossing, and dental visits  · Exercise: the importance of regular exercise/physical activity was discussed  Recommend exercise 3-5 times per week for at least 30 minutes  No follow-ups on file       Chief Complaint:     Chief Complaint   Patient presents with    Annual Exam     PT is being seen for a annual exam History of Present Illness:     Adult Annual Physical   Patient here for a comprehensive physical exam  The patient reports no problems  Increased concentration and less anxiety since start celexa  Starting a new job within the [employer] requesting TB screen  Had a positive PPD in 08  CXR was negative   [place] in [place]  Diet and Physical Activity  · Diet/Nutrition: well balanced diet  · Exercise: 5-7 times a week on average  Does cardio and weight lifting     Depression Screening  PHQ-2/9 Depression Screening         General Health  · Sleep: sleeps well on Lunesta   · Hearing: normal - bilateral   · Vision: previous LASIK surgery 2-3 years ago   · Dental: regular dental visits  /GYN Health  · Patient is: premenopausal  · Last menstrual period: last week   · Contraceptive method: none  Review of Systems:     Review of Systems   Psychiatric/Behavioral: Negative for agitation, behavioral problems, decreased concentration and sleep disturbance  The patient is not nervous/anxious         Past Medical History:     Past Medical History:   Diagnosis Date    Asthma     Last Assessed 08Jun2012      Past Surgical History:     Past Surgical History:   Procedure Laterality Date    OOPHORECTOMY N/A     Lateral      Social History:     Social History     Socioeconomic History    Marital status: Single     Spouse name: Not on file    Number of children: Not on file    Years of education: Not on file    Highest education level: Not on file   Occupational History    Not on file   Tobacco Use    Smoking status: Never Smoker    Smokeless tobacco: Never Used   Vaping Use    Vaping Use: Never used   Substance and Sexual Activity    Alcohol use: Yes     Comment: social    Drug use: No    Sexual activity: Yes     Partners: Male     Birth control/protection: None   Other Topics Concern    Not on file   Social History Narrative    Drinks coffee     Social Determinants of Health     Financial Resource Strain: Not on file   Food Insecurity: Not on file   Transportation Needs: Not on file   Physical Activity: Not on file   Stress: Not on file   Social Connections: Not on file   Intimate Partner Violence: Not on file   Housing Stability: Not on file      Family History:     Family History   Problem Relation Age of Onset    Depression Mother     Alcohol abuse Father     Kidney disease Father     Cataracts Maternal Grandmother     Hyperlipidemia Maternal Grandmother     Breast cancer Maternal Grandmother     Diabetes Maternal Grandfather     No Known Problems Sister     No Known Problems Paternal Grandmother     No Known Problems Paternal Grandfather     No Known Problems Sister     No Known Problems Son     Cancer Maternal Uncle     No Known Problems Maternal Aunt     No Known Problems Maternal Aunt     No Known Problems Paternal Aunt     No Known Problems Paternal Aunt     No Known Problems Paternal Aunt     No Known Problems Paternal Aunt       Current Medications:     Current Outpatient Medications   Medication Sig Dispense Refill    ascorbic acid (VITAMIN C) 500 MG tablet Take 500 mg by mouth daily (Patient not taking: Reported on 8/26/2021)      CALCIUM CARBONATE-VITAMIN D PO Take 1 tablet by mouth      citalopram (CeleXA) 10 mg tablet Take 1 tablet (10 mg total) by mouth daily 90 tablet 1    [START ON 2/27/2022] eszopiclone (LUNESTA) 2 mg tablet Take 1 tablet (2 mg total) by mouth daily at bedtime as needed for sleep Take immediately before bedtime 30 tablet 3    multivitamin (THERAGRAN) TABS Take 1 tablet by mouth      Omega-3 Fatty Acids (FISH OIL PO) Take 2 g by mouth       No current facility-administered medications for this visit       Facility-Administered Medications Ordered in Other Visits   Medication Dose Route Frequency Provider Last Rate Last Admin    lidocaine-epinephrine (XYLOCAINE/EPINEPHRINE) 1 %-1:100,000 20 mL, sodium bicarbonate 2 mEq infiltration   Infiltration Once Leopold Mitts, MD          Allergies:     No Known Allergies   Physical Exam:     /70   Pulse 78   Temp 97 9 °F (36 6 °C) (Tympanic)   Resp 16   Ht 5' 4 96" (1 65 m)   Wt 68 kg (150 lb)   SpO2 99%   BMI 24 99 kg/m²     Physical Exam  Constitutional:       General: She is not in acute distress  Appearance: Normal appearance  She is not ill-appearing  HENT:      Head: Normocephalic and atraumatic  Eyes:      Extraocular Movements: Extraocular movements intact  Cardiovascular:      Rate and Rhythm: Normal rate and regular rhythm  Heart sounds: No murmur heard  Pulmonary:      Effort: Pulmonary effort is normal  No respiratory distress  Breath sounds: Normal breath sounds  No stridor  No wheezing or rhonchi  Skin:     General: Skin is warm  Neurological:      General: No focal deficit present  Mental Status: She is alert and oriented to person, place, and time  Psychiatric:         Mood and Affect: Mood normal          Behavior: Behavior normal          Thought Content:  Thought content normal          Judgment: Judgment normal           Perez Gu MD  [facility]

## 2022-02-09 NOTE — ASSESSMENT & PLAN NOTE
Received the flu vaccine and primary covid series  UTD for pap smears and mammo  Exercises 6 days a weeks and has a well balance diet  No longer drinking!!!  Encourage her to abstain from ETOH

## 2022-02-10 ENCOUNTER — NURSE TRIAGE (OUTPATIENT)
Dept: OTHER | Facility: OTHER | Age: 42
End: 2022-02-10

## 2022-02-10 NOTE — TELEPHONE ENCOUNTER
Regarding: stomach pressure-bloated  ----- Message from [facility] sent at 2/10/2022  7:11 AM EST -----  "I feel a lot of pressure and my stomach is bloated    I feel discomfort "

## 2022-02-11 ENCOUNTER — OFFICE VISIT (OUTPATIENT)
Dept: OBGYN CLINIC | Facility: CLINIC | Age: 42
End: 2022-02-11

## 2022-02-11 VITALS
BODY MASS INDEX: 24.83 KG/M2 | WEIGHT: 149 LBS | HEART RATE: 76 BPM | DIASTOLIC BLOOD PRESSURE: 64 MMHG | HEIGHT: 65 IN | SYSTOLIC BLOOD PRESSURE: 142 MMHG

## 2022-02-11 DIAGNOSIS — R39.15 URINARY URGENCY: ICD-10-CM

## 2022-02-11 DIAGNOSIS — Z11.3 SCREENING FOR STD (SEXUALLY TRANSMITTED DISEASE): Primary | ICD-10-CM

## 2022-02-11 DIAGNOSIS — R10.2 PELVIC PAIN: ICD-10-CM

## 2022-02-11 DIAGNOSIS — Z87.42 HISTORY OF OVARIAN CYST: ICD-10-CM

## 2022-02-11 DIAGNOSIS — N89.8 VAGINAL DISCHARGE: ICD-10-CM

## 2022-02-11 LAB
BV WHIFF TEST VAG QL: NEGATIVE
CLUE CELLS SPEC QL WET PREP: NEGATIVE
PH SMN: 4 [PH]
SL AMB  POCT GLUCOSE, UA: ABNORMAL
SL AMB LEUKOCYTE ESTERASE,UA: ABNORMAL
SL AMB POCT BILIRUBIN,UA: ABNORMAL
SL AMB POCT BLOOD,UA: ABNORMAL
SL AMB POCT CLARITY,UA: ABNORMAL
SL AMB POCT COLOR,UA: YELLOW
SL AMB POCT KETONES,UA: ABNORMAL
SL AMB POCT NITRITE,UA: ABNORMAL
SL AMB POCT PH,UA: 5
SL AMB POCT SPECIFIC GRAVITY,UA: 1
SL AMB POCT URINE PROTEIN: ABNORMAL
SL AMB POCT UROBILINOGEN: 0.2
SL AMB POCT WET MOUNT: NORMAL
T VAGINALIS VAG QL WET PREP: NEGATIVE
YEAST VAG QL WET PREP: NEGATIVE

## 2022-02-11 PROCEDURE — 81002 URINALYSIS NONAUTO W/O SCOPE: CPT | Performed by: NURSE PRACTITIONER

## 2022-02-11 PROCEDURE — 87491 CHLMYD TRACH DNA AMP PROBE: CPT | Performed by: NURSE PRACTITIONER

## 2022-02-11 PROCEDURE — 87086 URINE CULTURE/COLONY COUNT: CPT | Performed by: NURSE PRACTITIONER

## 2022-02-11 PROCEDURE — 99213 OFFICE O/P EST LOW 20 MIN: CPT | Performed by: NURSE PRACTITIONER

## 2022-02-11 PROCEDURE — 87210 SMEAR WET MOUNT SALINE/INK: CPT | Performed by: NURSE PRACTITIONER

## 2022-02-11 PROCEDURE — 87591 N.GONORRHOEAE DNA AMP PROB: CPT | Performed by: NURSE PRACTITIONER

## 2022-02-11 NOTE — PROGRESS NOTES
PROBLEM GYNECOLOGICAL VISIT    Aliyah Conde is a 42 y o  female who presents today with complaint of urgency and pelvic pressure  Her general medical history has been reviewed and she reports it as follows:    Past Medical History:   Diagnosis Date    Asthma     Last Assessed 2012     Past Surgical History:   Procedure Laterality Date    OOPHORECTOMY N/A     Lateral     OB History        1    Para   1    Term   1            AB        Living   1       SAB        IAB        Ectopic        Multiple        Live Births                   Social History     Tobacco Use    Smoking status: Never Smoker    Smokeless tobacco: Never Used   Vaping Use    Vaping Use: Never used   Substance Use Topics    Alcohol use: Yes     Comment: social    Drug use: No       Current Outpatient Medications   Medication Instructions    ascorbic acid (VITAMIN C) 500 mg, Daily    CALCIUM CARBONATE-VITAMIN D PO 1 tablet, Oral    citalopram (CELEXA) 10 mg, Oral, Daily    [START ON 2022] eszopiclone (LUNESTA) 2 mg, Oral, Daily at bedtime PRN, Take immediately before bedtime    multivitamin (THERAGRAN) TABS 1 tablet, Oral    Omega-3 Fatty Acids (FISH OIL PO) 2 g, Oral       History of Present Illness:   Ramanyne Daly presents today reporting a year long history of intermittent pelvic pain, abdominal pain, and bloating  No constant pelvic pain, pain seems to be related to menstrual cycle  She does have a history of a right sided oophorectomy after an ovarian cyst over 10 years ago  She is also reporting an increase in the amount of vaginal discharge she is having, seems to change with cycle  Discharge is clear-milky white  She denies any vaginal odor, itching or irritation  She is also reporting urinary urgency for 4 days, deinies any other urinary symptoms  Review of Systems:  Review of Systems   Constitutional: Negative  Gastrointestinal: Negative      Genitourinary: Positive for pelvic pain and urgency  Physical Exam:  /64   Pulse 76   Ht 5' 4 96" (1 65 m)   Wt 67 6 kg (149 lb)   LMP 01/07/2022   BMI 24 83 kg/m²   Physical Exam  Constitutional:       General: She is not in acute distress  Appearance: Normal appearance  Genitourinary:      Vulva normal       No lesions in the vagina  No vaginal discharge or tenderness  Right Adnexa: tender  Right Adnexa: not full and no mass present  Left Adnexa: not tender, not full and no mass present  No cervical motion tenderness, discharge or lesion  Abdominal:      General: Abdomen is flat  Palpations: Abdomen is soft  Neurological:      Mental Status: She is alert  Skin:     General: Skin is warm and dry  Psychiatric:         Mood and Affect: Mood normal          Behavior: Behavior normal    Vitals reviewed  Point of Care Testing:   -Wet mount: -yeast, -renetta cells, -trich   -KOH mount: -yeast   -Whiff: negative    -pH: 4 0   -urine dip: trace ketones, negative for all other components     Assessment:   1  Pelvic pain    2  History of ovarian cyst   3  Vaginal discharge   4  Urinary urgency    5  STI screening     Plan:   1  Orders placed for pelvic US  Does have slight tenderness to right side of bimanual exam  Would like to be called with US results  2  Wet mount negative for infection  Reviewed vulvar skin care measures  3  Urine dip negative for UTI  4  Screening collected for GC/CT  5  Return for follow up pending US results, will call when results are available

## 2022-02-12 LAB — BACTERIA UR CULT: NORMAL

## 2022-02-14 LAB
C TRACH DNA SPEC QL NAA+PROBE: NEGATIVE
N GONORRHOEA DNA SPEC QL NAA+PROBE: NEGATIVE

## 2022-02-15 ENCOUNTER — HOSPITAL ENCOUNTER (OUTPATIENT)
Dept: RADIOLOGY | Facility: HOSPITAL | Age: 42
Discharge: HOME | End: 2022-02-15
Payer: COMMERCIAL

## 2022-02-15 DIAGNOSIS — R10.2 PELVIC PAIN: ICD-10-CM

## 2022-02-15 PROCEDURE — 76830 TRANSVAGINAL US NON-OB: CPT

## 2022-02-15 PROCEDURE — 76856 US EXAM PELVIC COMPLETE: CPT

## 2022-02-21 ENCOUNTER — APPOINTMENT (OUTPATIENT)
Dept: LAB | Facility: HOSPITAL | Age: 42
End: 2022-02-21
Payer: COMMERCIAL

## 2022-02-21 DIAGNOSIS — Z92.89 HISTORY OF POSITIVE PPD: ICD-10-CM

## 2022-02-21 PROCEDURE — 36415 COLL VENOUS BLD VENIPUNCTURE: CPT

## 2022-02-21 PROCEDURE — 86480 TB TEST CELL IMMUN MEASURE: CPT

## 2022-02-22 LAB
GAMMA INTERFERON BACKGROUND BLD IA-ACNC: 0.07 IU/ML
M TB IFN-G BLD-IMP: NEGATIVE
M TB IFN-G CD4+ BCKGRND COR BLD-ACNC: -0.03 IU/ML
M TB IFN-G CD4+ BCKGRND COR BLD-ACNC: -0.03 IU/ML
MITOGEN IGNF BCKGRD COR BLD-ACNC: 7.54 IU/ML

## 2022-03-01 ENCOUNTER — TELEPHONE (OUTPATIENT)
Dept: OTHER | Facility: OTHER | Age: 42
End: 2022-03-01

## 2022-03-01 NOTE — TELEPHONE ENCOUNTER
Patient had annual exam on 2/9/22 and on her paperwork it states a diagnosis of loose stools  Pt states she does not have loose stools and would like clarification and a call back

## 2022-03-01 NOTE — TELEPHONE ENCOUNTER
I have not seen this pt since 07/2021  She saw Dr Pooja Melchor on 2/9/22 - I see no mention of loose stools  Thanks    Micheal

## 2022-03-02 NOTE — TELEPHONE ENCOUNTER
The diagnosis was made in 2020 by another provider  I did not make this diagnosis nor was this mentioned at our last visit   This may have been complaint in the past and could be updated if patient is no longer having issues

## 2022-03-28 ENCOUNTER — TELEPHONE (OUTPATIENT)
Dept: OBGYN CLINIC | Facility: CLINIC | Age: 42
End: 2022-03-28

## 2022-03-28 DIAGNOSIS — Z30.09 UNWANTED FERTILITY: Primary | ICD-10-CM

## 2022-03-28 RX ORDER — NORETHINDRONE ACETATE AND ETHINYL ESTRADIOL 1MG-20(21)
1 KIT ORAL DAILY
Qty: 28 TABLET | Refills: 3 | Status: SHIPPED | OUTPATIENT
Start: 2022-03-28 | End: 2022-04-11

## 2022-03-28 NOTE — TELEPHONE ENCOUNTER
Requesting to change birth control from [address] back to [facility]  Familiar with instructions for use  [facility] check in 3 months

## 2022-04-11 ENCOUNTER — TELEPHONE (OUTPATIENT)
Dept: OBGYN CLINIC | Facility: CLINIC | Age: 42
End: 2022-04-11

## 2022-04-11 DIAGNOSIS — Z30.09 UNWANTED FERTILITY: Primary | ICD-10-CM

## 2022-04-11 RX ORDER — ETONOGESTREL AND ETHINYL ESTRADIOL .12; .015 MG/D; MG/D
RING VAGINAL
Qty: 1 EACH | Refills: 3 | Status: SHIPPED | OUTPATIENT
Start: 2022-04-11 | End: 2022-04-14

## 2022-04-11 RX ORDER — ETONOGESTREL AND ETHINYL ESTRADIOL .12; .015 MG/D; MG/D
RING VAGINAL
Qty: 1 EACH | Refills: 11 | Status: SHIPPED | OUTPATIENT
Start: 2022-04-11 | End: 2022-04-11

## 2022-04-11 NOTE — TELEPHONE ENCOUNTER
Nurse line: Patient had questions regarding her birth control  Called patient and left message  Call back telephone number was provided

## 2022-04-14 DIAGNOSIS — Z30.09 UNWANTED FERTILITY: Primary | ICD-10-CM

## 2022-04-14 RX ORDER — ETONOGESTREL/ETHINYL ESTRADIOL .12-.015MG
RING, VAGINAL VAGINAL
Qty: 1 EACH | Refills: 3 | Status: SHIPPED | OUTPATIENT
Start: 2022-04-14 | End: 2022-08-09

## 2022-06-21 ENCOUNTER — TELEMEDICINE (OUTPATIENT)
Dept: FAMILY MEDICINE CLINIC | Facility: CLINIC | Age: 42
End: 2022-06-21
Payer: COMMERCIAL

## 2022-06-21 VITALS — BODY MASS INDEX: 24.99 KG/M2 | HEIGHT: 65 IN | WEIGHT: 150 LBS

## 2022-06-21 DIAGNOSIS — B34.9 VIRAL INFECTION, UNSPECIFIED: ICD-10-CM

## 2022-06-21 DIAGNOSIS — R11.2 NAUSEA AND VOMITING, UNSPECIFIED VOMITING TYPE: Primary | ICD-10-CM

## 2022-06-21 PROCEDURE — 99213 OFFICE O/P EST LOW 20 MIN: CPT | Performed by: NURSE PRACTITIONER

## 2022-06-21 PROCEDURE — U0005 INFEC AGEN DETEC AMPLI PROBE: HCPCS | Performed by: NURSE PRACTITIONER

## 2022-06-21 PROCEDURE — U0003 INFECTIOUS AGENT DETECTION BY NUCLEIC ACID (DNA OR RNA); SEVERE ACUTE RESPIRATORY SYNDROME CORONAVIRUS 2 (SARS-COV-2) (CORONAVIRUS DISEASE [COVID-19]), AMPLIFIED PROBE TECHNIQUE, MAKING USE OF HIGH THROUGHPUT TECHNOLOGIES AS DESCRIBED BY CMS-2020-01-R: HCPCS | Performed by: NURSE PRACTITIONER

## 2022-06-21 RX ORDER — ONDANSETRON 4 MG/1
4 TABLET, FILM COATED ORAL EVERY 8 HOURS PRN
Qty: 20 TABLET | Refills: 0 | Status: SHIPPED | OUTPATIENT
Start: 2022-06-21 | End: 2022-08-09

## 2022-06-21 NOTE — ASSESSMENT & PLAN NOTE
Sx onset 6/19 with loss of appetite, nausea, vomiting  Abd pain x 2 days now resolved  Cough started today, c/o fatigue and sore throat  She is tolerating water, only eating crackers  She denies headache, decreased urine output, diarrhea  Will test for covid  Pt was at work, advised to go home while we await her test result, she is unable to leave work  Stressed importance of wearing her mask  She will come for testing after work  Rx sent for zofran, advised her to increase fluids

## 2022-06-21 NOTE — PROGRESS NOTES
COVID-19 Outpatient Progress Note    Assessment/Plan:    Problem List Items Addressed This Visit        Digestive    Nausea and vomiting - Primary     Sx onset 6/19 with loss of appetite, nausea, vomiting  Abd pain x 2 days now resolved  Cough started today, c/o fatigue and sore throat  She is tolerating water, only eating crackers  She denies headache, decreased urine output, diarrhea  Will test for covid  Pt was at work, advised to go home while we await her test result, she is unable to leave work  Stressed importance of wearing her mask  She will come for testing after work  Rx sent for zofran, advised her to increase fluids  Relevant Medications    ondansetron (ZOFRAN) 4 mg tablet      Other Visit Diagnoses     Viral infection, unspecified        Relevant Orders    COVID Only - Office Collect         Disposition:     Recommended patient to come to the office to test for COVID-19  Discussed symptom directed medication options with patient  I have spent 15 minutes directly with the patient  Greater than 50% of this time was spent in counseling/coordination of care regarding: prognosis, risks and benefits of treatment options, instructions for management, patient and family education, importance of treatment compliance, risk factor reductions and impressions  Encounter provider Willian Melgar    Provider located at [address] [place] [id number]    Recent Visits  No visits were found meeting these conditions  Showing recent visits within past 7 days and meeting all other requirements  Today's Visits  Date Type Provider Dept   06/21/22 Telemedicine Silvana Simpson, [address] [place] [address]   Showing today's visits and meeting all other requirements  Future Appointments  No visits were found meeting these conditions    Showing future appointments within next 150 days and meeting all other requirements     This virtual check-in was done via [facility] and patient was informed that this is a secure, HIPAA-compliant platform  She agrees to proceed  Patient agrees to participate in a virtual check in via telephone or video visit instead of presenting to the office to address urgent/immediate medical needs  Patient is aware this is a billable service  After connecting through [facility], the patient was identified by name and date of birth  Marie Morin was informed that this was a telemedicine visit and that the exam was being conducted confidentially over secure lines  My office door was closed  The patient was notified the following individuals were present in the room: Jose Tran  Marie Morin acknowledged consent and understanding of privacy and security of the telemedicine visit  I informed the patient that I have reviewed her record in Epic and presented the opportunity for her to ask any questions regarding the visit today  The patient agreed to participate  Verification of patient location:  Patient is located in the following state in which I hold an active license: [place]    Subjective:   Marie Morin is a 36 y o  female who is concerned about COVID-19  Patient's symptoms include fatigue, sore throat, cough, nausea and vomiting  Patient denies fever, chills, malaise, congestion, rhinorrhea, anosmia, loss of taste, shortness of breath, chest tightness, abdominal pain, diarrhea, myalgias and headaches  - Date of symptom onset: 6/19/2022      COVID-19 vaccination status: Fully vaccinated (primary series)    Exposure:   Contact with a person who is under investigation (PUI) for or who is positive for COVID-19 within the last 14 days?: No    Hospitalized recently for fever and/or lower respiratory symptoms?: No      Currently a healthcare worker that is involved in direct patient care?: No      Works in a special setting where the risk of COVID-19 transmission may be high? (this may include long-term care, correctional and [facility] facilities; homeless shelters; assisted-living facilities and group homes ): No      Resident in a special setting where the risk of COVID-19 transmission may be high? (this may include long-term care, correctional and [facility] facilities; homeless shelters; assisted-living facilities and group homes ): No      Lab Results   Component Value Date    SARSCOV2 Negative 12/30/2021    SARSCOV2 Negative 10/13/2021     Past Medical History:   Diagnosis Date    Asthma     Last Assessed [id number]     Past Surgical History:   Procedure Laterality Date    OOPHORECTOMY N/A     Lateral     Current Outpatient Medications   Medication Sig Dispense Refill    ascorbic acid (VITAMIN C) 500 MG tablet Take 500 mg by mouth daily      CALCIUM CARBONATE-VITAMIN D PO Take 1 tablet by mouth      citalopram (CeleXA) 10 mg tablet Take 1 tablet (10 mg total) by mouth daily 90 tablet 1    eszopiclone (LUNESTA) 2 mg tablet Take 1 tablet (2 mg total) by mouth daily at bedtime as needed for sleep Take immediately before bedtime 30 tablet 3    multivitamin (THERAGRAN) TABS Take 1 tablet by mouth      NuvaRing 0 12-0 015 MG/24HR vaginal ring Insert vaginally and leave in place for 3 consecutive weeks, then remove for 1 week  1 each 3    Omega-3 Fatty Acids (FISH OIL PO) Take 2 g by mouth      ondansetron (ZOFRAN) 4 mg tablet Take 1 tablet (4 mg total) by mouth every 8 (eight) hours as needed for nausea or vomiting 20 tablet 0     No current facility-administered medications for this visit  Facility-Administered Medications Ordered in Other Visits   Medication Dose Route Frequency Provider Last Rate Last Admin    lidocaine-epinephrine (XYLOCAINE/EPINEPHRINE) 1 %-1:100,000 20 mL, sodium bicarbonate 2 mEq infiltration   Infiltration Once Aram Engle MD         No Known Allergies    Review of Systems   Constitutional: Positive for fatigue  Negative for chills and fever  HENT: Positive for sore throat  Negative for congestion and rhinorrhea  Respiratory: Positive for cough  Negative for chest tightness and shortness of breath  Gastrointestinal: Positive for nausea and vomiting  Negative for abdominal pain and diarrhea  Musculoskeletal: Negative for myalgias  Neurological: Negative for headaches  Objective:    Vitals:    06/21/22 1136   Weight: 68 kg (150 lb)   Height: 5' 4 96" (1 65 m)       Physical Exam  Constitutional:       General: She is awake  She is not in acute distress  Appearance: Normal appearance  She is well-developed and well-groomed  She is not ill-appearing  Eyes:      General: Lids are normal       Conjunctiva/sclera: Conjunctivae normal    Pulmonary:      Effort: Pulmonary effort is normal  No tachypnea, accessory muscle usage or respiratory distress  Neurological:      Mental Status: She is alert and oriented to person, place, and time  Psychiatric:         Attention and Perception: Attention normal          Mood and Affect: Mood normal          Speech: Speech normal          Behavior: Behavior normal  Behavior is cooperative  Thought Content: Thought content normal          Cognition and Memory: Cognition normal          Judgment: Judgment normal          VIRTUAL VISIT DISCLAIMER    [facility] verbally agrees to participate in [employer]  Pt is aware that [employer] could be limited without vital signs or the ability to perform a full hands-on physical Dacia Meyers understands she or the provider may request at any time to terminate the video visit and request the patient to seek care or treatment in person

## 2022-06-21 NOTE — LETTER
6/21/22    To Whom it May Concern:    Dane Munoz is a patient under my care  She has an acute illness and was unable to work on 6/20/22  Please contact me if you have any question        Thank you,    DERIAN Parrish

## 2022-06-22 LAB — SARS-COV-2 RNA RESP QL NAA+PROBE: NEGATIVE

## 2022-07-31 ENCOUNTER — OFFICE VISIT (OUTPATIENT)
Dept: LAB | Facility: HOSPITAL | Age: 42
End: 2022-07-31
Payer: COMMERCIAL

## 2022-07-31 ENCOUNTER — APPOINTMENT (OUTPATIENT)
Dept: LAB | Facility: HOSPITAL | Age: 42
End: 2022-07-31
Payer: COMMERCIAL

## 2022-07-31 DIAGNOSIS — Z01.818 OTHER SPECIFIED PRE-OPERATIVE EXAMINATION: ICD-10-CM

## 2022-07-31 LAB
ALBUMIN SERPL BCP-MCNC: 3.9 G/DL (ref 3.5–5)
ALP SERPL-CCNC: 65 U/L (ref 46–116)
ALT SERPL W P-5'-P-CCNC: 20 U/L (ref 12–78)
ANION GAP SERPL CALCULATED.3IONS-SCNC: 3 MMOL/L (ref 4–13)
APTT PPP: 25 SECONDS (ref 23–37)
AST SERPL W P-5'-P-CCNC: 12 U/L (ref 5–45)
B-HCG SERPL-ACNC: <2 MIU/ML
BASOPHILS # BLD AUTO: 0.03 THOUSANDS/ΜL (ref 0–0.1)
BASOPHILS NFR BLD AUTO: 1 % (ref 0–1)
BILIRUB SERPL-MCNC: 0.69 MG/DL (ref 0.2–1)
BUN SERPL-MCNC: 15 MG/DL (ref 5–25)
CALCIUM SERPL-MCNC: 9.2 MG/DL (ref 8.3–10.1)
CHLORIDE SERPL-SCNC: 107 MMOL/L (ref 96–108)
CO2 SERPL-SCNC: 27 MMOL/L (ref 21–32)
CREAT SERPL-MCNC: 0.93 MG/DL (ref 0.6–1.3)
EOSINOPHIL # BLD AUTO: 0.37 THOUSAND/ΜL (ref 0–0.61)
EOSINOPHIL NFR BLD AUTO: 7 % (ref 0–6)
ERYTHROCYTE [DISTWIDTH] IN BLOOD BY AUTOMATED COUNT: 13.1 % (ref 11.6–15.1)
EST. AVERAGE GLUCOSE BLD GHB EST-MCNC: 103 MG/DL
GFR SERPL CREATININE-BSD FRML MDRD: 75 ML/MIN/1.73SQ M
GLUCOSE P FAST SERPL-MCNC: 85 MG/DL (ref 65–99)
HBA1C MFR BLD: 5.2 %
HCT VFR BLD AUTO: 39.7 % (ref 34.8–46.1)
HGB BLD-MCNC: 12.8 G/DL (ref 11.5–15.4)
IMM GRANULOCYTES # BLD AUTO: 0.02 THOUSAND/UL (ref 0–0.2)
IMM GRANULOCYTES NFR BLD AUTO: 0 % (ref 0–2)
INR PPP: 0.9 (ref 0.84–1.19)
LYMPHOCYTES # BLD AUTO: 1.55 THOUSANDS/ΜL (ref 0.6–4.47)
LYMPHOCYTES NFR BLD AUTO: 28 % (ref 14–44)
MCH RBC QN AUTO: 29.3 PG (ref 26.8–34.3)
MCHC RBC AUTO-ENTMCNC: 32.2 G/DL (ref 31.4–37.4)
MCV RBC AUTO: 91 FL (ref 82–98)
MONOCYTES # BLD AUTO: 0.65 THOUSAND/ΜL (ref 0.17–1.22)
MONOCYTES NFR BLD AUTO: 12 % (ref 4–12)
NEUTROPHILS # BLD AUTO: 2.85 THOUSANDS/ΜL (ref 1.85–7.62)
NEUTS SEG NFR BLD AUTO: 52 % (ref 43–75)
NRBC BLD AUTO-RTO: 0 /100 WBCS
PLATELET # BLD AUTO: 280 THOUSANDS/UL (ref 149–390)
PMV BLD AUTO: 9.7 FL (ref 8.9–12.7)
POTASSIUM SERPL-SCNC: 4.2 MMOL/L (ref 3.5–5.3)
PROT SERPL-MCNC: 7.8 G/DL (ref 6.4–8.4)
PROTHROMBIN TIME: 12.4 SECONDS (ref 11.6–14.5)
RBC # BLD AUTO: 4.37 MILLION/UL (ref 3.81–5.12)
SODIUM SERPL-SCNC: 137 MMOL/L (ref 135–147)
WBC # BLD AUTO: 5.47 THOUSAND/UL (ref 4.31–10.16)

## 2022-07-31 PROCEDURE — 85730 THROMBOPLASTIN TIME PARTIAL: CPT

## 2022-07-31 PROCEDURE — 36415 COLL VENOUS BLD VENIPUNCTURE: CPT

## 2022-07-31 PROCEDURE — 85025 COMPLETE CBC W/AUTO DIFF WBC: CPT

## 2022-07-31 PROCEDURE — 83036 HEMOGLOBIN GLYCOSYLATED A1C: CPT

## 2022-07-31 PROCEDURE — 84702 CHORIONIC GONADOTROPIN TEST: CPT

## 2022-07-31 PROCEDURE — 87389 HIV-1 AG W/HIV-1&-2 AB AG IA: CPT

## 2022-07-31 PROCEDURE — 93005 ELECTROCARDIOGRAM TRACING: CPT

## 2022-07-31 PROCEDURE — 85610 PROTHROMBIN TIME: CPT

## 2022-07-31 PROCEDURE — 80053 COMPREHEN METABOLIC PANEL: CPT

## 2022-08-01 LAB
ATRIAL RATE: 69 BPM
ATRIAL RATE: 69 BPM
P AXIS: 30 DEGREES
P AXIS: 30 DEGREES
PR INTERVAL: 144 MS
PR INTERVAL: 144 MS
QRS AXIS: 66 DEGREES
QRS AXIS: 66 DEGREES
QRSD INTERVAL: 80 MS
QRSD INTERVAL: 80 MS
QT INTERVAL: 378 MS
QT INTERVAL: 378 MS
QTC INTERVAL: 405 MS
QTC INTERVAL: 405 MS
T WAVE AXIS: 47 DEGREES
T WAVE AXIS: 47 DEGREES
VENTRICULAR RATE: 69 BPM
VENTRICULAR RATE: 69 BPM

## 2022-08-01 PROCEDURE — 93010 ELECTROCARDIOGRAM REPORT: CPT | Performed by: INTERNAL MEDICINE

## 2022-08-02 ENCOUNTER — TELEPHONE (OUTPATIENT)
Dept: OTHER | Facility: OTHER | Age: 42
End: 2022-08-02

## 2022-08-02 LAB — HIV 1+2 AB+HIV1 P24 AG SERPL QL IA: NORMAL

## 2022-08-03 ENCOUNTER — TELEPHONE (OUTPATIENT)
Dept: OTHER | Facility: OTHER | Age: 42
End: 2022-08-03

## 2022-08-03 ENCOUNTER — HOSPITAL ENCOUNTER (OUTPATIENT)
Dept: RADIOLOGY | Facility: HOSPITAL | Age: 42
Discharge: HOME | End: 2022-08-03
Payer: COMMERCIAL

## 2022-08-03 DIAGNOSIS — Z01.818 OTHER SPECIFIED PRE-OPERATIVE EXAMINATION: ICD-10-CM

## 2022-08-03 PROCEDURE — 71046 X-RAY EXAM CHEST 2 VIEWS: CPT

## 2022-08-03 NOTE — TELEPHONE ENCOUNTER
Pt is requesting images of the ECG & Xray be faxed to her surgeon at: [phone]  Please call patient to confirm it has been done as the provider needs it for her appt next Tuesday  Lukasz Dupont

## 2022-08-09 ENCOUNTER — OFFICE VISIT (OUTPATIENT)
Dept: FAMILY MEDICINE CLINIC | Facility: CLINIC | Age: 42
End: 2022-08-09
Payer: COMMERCIAL

## 2022-08-09 VITALS
RESPIRATION RATE: 16 BRPM | BODY MASS INDEX: 26.36 KG/M2 | HEART RATE: 70 BPM | DIASTOLIC BLOOD PRESSURE: 70 MMHG | HEIGHT: 64 IN | TEMPERATURE: 98.8 F | WEIGHT: 154.4 LBS | OXYGEN SATURATION: 98 % | SYSTOLIC BLOOD PRESSURE: 122 MMHG

## 2022-08-09 DIAGNOSIS — Z01.810 PREOP CARDIOVASCULAR EXAM: Primary | ICD-10-CM

## 2022-08-09 DIAGNOSIS — Z23 ENCOUNTER FOR IMMUNIZATION: ICD-10-CM

## 2022-08-09 PROCEDURE — 3725F SCREEN DEPRESSION PERFORMED: CPT | Performed by: FAMILY MEDICINE

## 2022-08-09 PROCEDURE — 99214 OFFICE O/P EST MOD 30 MIN: CPT | Performed by: FAMILY MEDICINE

## 2022-08-09 RX ORDER — SODIUM FLUORIDE 6 MG/ML
PASTE, DENTIFRICE DENTAL
COMMUNITY
Start: 2022-07-01

## 2022-08-09 RX ORDER — LANOLIN ALCOHOL/MO/W.PET/CERES
400 CREAM (GRAM) TOPICAL DAILY
COMMUNITY

## 2022-08-09 RX ORDER — FERROUS SULFATE 325(65) MG
325 TABLET ORAL
COMMUNITY

## 2022-08-09 NOTE — PROGRESS NOTES
PRE-OPERATIVE EXAMINATION  Juancarlos Guerrero  1980    Juancarlos Guerrero is a 36 y o  healthy female with history of isomnia and anxiety who is planning to undergo abdominoplasty under general anesthesia  Procedure scheduled for 8/30/22 and will be performed by Dr Hortencia Up  Patient has not had complications with anesthesia in the past     ROS:   Chest pain: no   Shortness of breath: no  Shortness of breath with exertion: no  Orthopnea: no  Dizziness: no  Unexplained weight change: no    PMH:  CAD: no  HTN: no  CKD: no  DM: no on insulin: no  History of CVA: no     reports that she has never smoked  She has never used smokeless tobacco  She reports current alcohol use  She reports that she does not use drugs  /70 (BP Location: Left arm, Patient Position: Sitting, Cuff Size: Standard)   Pulse 70   Temp 98 8 °F (37 1 °C) (Tympanic)   Resp 16   Ht 5' 3 75" (1 619 m)   Wt 70 kg (154 lb 6 4 oz)   SpO2 98%   BMI 26 71 kg/m²   Physical Exam  Vitals reviewed  Constitutional:       General: She is not in acute distress  Appearance: Normal appearance  She is not ill-appearing or toxic-appearing  HENT:      Head: Normocephalic and atraumatic  Mouth/Throat:      Mouth: Mucous membranes are moist       Pharynx: No oropharyngeal exudate or posterior oropharyngeal erythema  Eyes:      Extraocular Movements: Extraocular movements intact  Neck:      Vascular: No carotid bruit  Cardiovascular:      Rate and Rhythm: Normal rate and regular rhythm  Heart sounds: No murmur heard  Pulmonary:      Effort: Pulmonary effort is normal  No respiratory distress  Breath sounds: Normal breath sounds  No stridor  No wheezing, rhonchi or rales  Abdominal:      General: Abdomen is flat  There is no distension  Palpations: There is no mass  Tenderness: There is no abdominal tenderness  There is no guarding or rebound  Hernia: No hernia is present  Musculoskeletal:      Right lower leg: No edema  Left lower leg: No edema  Lymphadenopathy:      Cervical: No cervical adenopathy  Skin:     General: Skin is warm  Neurological:      Mental Status: She is alert and oriented to person, place, and time  Motor: No weakness  Gait: Gait normal    Psychiatric:         Mood and Affect: Mood normal          Behavior: Behavior normal          Revised Cardiac Risk Index (RCRI) for Pre-Operative Risk   (estimates risk of cardiac complications after noncardiac surgery)    · High-risk surgery: No 0   · History of ischemic heart disease: No 0   · History of CHF: No 0  · History of cerebrovascular disease: No 0  · Pre-operative treatment with insulin: No 0  · Pre-operative creatinine >2 mg/dL: No 0    RCRI Scoring:  · 0 points: Class I Risk, 3 9% Risk of Major Cardiac Event     Functional Capacity Levels    Good (7-10 METs): yes   - Walk up 2 flights of stairs    - Do heavy work I e scrubbing floors, lifting or moving furniture      Lab Results   Component Value Date    CREATININE 0 93 07/31/2022     Lab Results   Component Value Date    WBC 5 47 07/31/2022    HGB 12 8 07/31/2022    HCT 39 7 07/31/2022    MCV 91 07/31/2022     07/31/2022     EKG reviewed: normal EKG, normal sinus rhythm, unchanged from previous tracings  Seema Stevenson was seen today for pre-op exam     Diagnoses and all orders for this visit:    Preop cardiovascular exam    Encounter for immunization        Recommendations:  Emi Reyes is undergoing an elective Low Risk surgery, abdominoplasty with liposuction  She is RCRI class I risk (0 points ) with 3 9% risk for major adverse cardiac event (MACE)  Reviewed blood work, CXR, and EKG  She may proceed with surgery as planned without further workup  COVID screen scheduled 1 week prior to surgery  Pre-operative form completed and will be faxed to office as requested        ODELL Ventura

## 2022-08-16 ENCOUNTER — TELEPHONE (OUTPATIENT)
Dept: OTHER | Facility: OTHER | Age: 42
End: 2022-08-16

## 2022-08-16 NOTE — TELEPHONE ENCOUNTER
Patient's surgical clearance has not been receved by the surgical center for her procedure on 08/29 please fax to  [phone]

## 2022-08-16 NOTE — TELEPHONE ENCOUNTER
Please advise  Is there a form to fill out or fax office note?  I do not see form scanned into chart

## 2022-08-17 NOTE — TELEPHONE ENCOUNTER
Pt called stating that there was a part pf a form filled out under cardiac risk index and the answer was no, but then there were comments added  Pt's surgeon's office asking why there were comments if answer was no   she's hard to understand so I think that was the basic gist of it   Surgeon's office needs it re-faxed with comments removed

## 2022-08-17 NOTE — TELEPHONE ENCOUNTER
Patient called in stating there is some confusion with medical clearance and is requesting a callback

## 2022-08-17 NOTE — TELEPHONE ENCOUNTER
I updated the note to highlight that the patient is cleared without further testing  Based on the new risk assessment, patient with the lowest risk are considered to have a 3 9% risk  This is the lowest risk  No one has a 0% risk  Not sure how else they want me to word it

## 2022-08-24 ENCOUNTER — CLINICAL SUPPORT (OUTPATIENT)
Dept: FAMILY MEDICINE CLINIC | Facility: CLINIC | Age: 42
End: 2022-08-24

## 2022-08-24 DIAGNOSIS — Z01.812 ENCOUNTER FOR PREOPERATIVE SCREENING LABORATORY TESTING FOR COVID-19 VIRUS: Primary | ICD-10-CM

## 2022-08-24 DIAGNOSIS — Z20.822 ENCOUNTER FOR PREOPERATIVE SCREENING LABORATORY TESTING FOR COVID-19 VIRUS: Primary | ICD-10-CM

## 2022-08-24 PROCEDURE — U0005 INFEC AGEN DETEC AMPLI PROBE: HCPCS | Performed by: FAMILY MEDICINE

## 2022-08-24 PROCEDURE — U0003 INFECTIOUS AGENT DETECTION BY NUCLEIC ACID (DNA OR RNA); SEVERE ACUTE RESPIRATORY SYNDROME CORONAVIRUS 2 (SARS-COV-2) (CORONAVIRUS DISEASE [COVID-19]), AMPLIFIED PROBE TECHNIQUE, MAKING USE OF HIGH THROUGHPUT TECHNOLOGIES AS DESCRIBED BY CMS-2020-01-R: HCPCS | Performed by: FAMILY MEDICINE

## 2022-08-24 NOTE — PROGRESS NOTES
Assessment/Plan:    No problem-specific Assessment & Plan notes found for this encounter  There are no diagnoses linked to this encounter  Subjective:      Patient ID: Juan Pablo Dumont is a 36 y o  female  HPI    Patient here for COVID-19 swab prior to surgery  Review of Systems      Objective: There were no vitals taken for this visit           Physical Exam

## 2022-08-25 ENCOUNTER — TELEPHONE (OUTPATIENT)
Dept: FAMILY MEDICINE CLINIC | Facility: CLINIC | Age: 42
End: 2022-08-25

## 2022-08-25 ENCOUNTER — HOSPITAL ENCOUNTER (OUTPATIENT)
Dept: MAMMOGRAPHY | Facility: HOSPITAL | Age: 42
Discharge: HOME | End: 2022-08-25
Payer: COMMERCIAL

## 2022-08-25 VITALS — WEIGHT: 154.32 LBS | BODY MASS INDEX: 26.35 KG/M2 | HEIGHT: 64 IN

## 2022-08-25 DIAGNOSIS — Z12.31 ENCOUNTER FOR SCREENING MAMMOGRAM FOR MALIGNANT NEOPLASM OF BREAST: ICD-10-CM

## 2022-08-25 LAB — SARS-COV-2 RNA RESP QL NAA+PROBE: NEGATIVE

## 2022-08-25 PROCEDURE — 77067 SCR MAMMO BI INCL CAD: CPT

## 2022-08-25 PROCEDURE — 77063 BREAST TOMOSYNTHESIS BI: CPT

## 2022-08-25 NOTE — TELEPHONE ENCOUNTER
Alejandra Laboy,     This patient called to remind you to please fax results for covid test to Dr Darren Biggs if you havn't already [phone]      Thank you

## 2022-09-06 ENCOUNTER — TELEPHONE (OUTPATIENT)
Dept: MAMMOGRAPHY | Facility: CLINIC | Age: 42
End: 2022-09-06

## 2022-09-29 ENCOUNTER — OFFICE VISIT (OUTPATIENT)
Dept: FAMILY MEDICINE CLINIC | Facility: CLINIC | Age: 42
End: 2022-09-29
Payer: COMMERCIAL

## 2022-09-29 VITALS
SYSTOLIC BLOOD PRESSURE: 124 MMHG | HEART RATE: 75 BPM | BODY MASS INDEX: 24.11 KG/M2 | OXYGEN SATURATION: 100 % | TEMPERATURE: 97.9 F | WEIGHT: 141.2 LBS | DIASTOLIC BLOOD PRESSURE: 80 MMHG | HEIGHT: 64 IN | RESPIRATION RATE: 14 BRPM

## 2022-09-29 DIAGNOSIS — F51.04 PSYCHOPHYSIOLOGICAL INSOMNIA: Primary | ICD-10-CM

## 2022-09-29 DIAGNOSIS — F41.9 ANXIETY: ICD-10-CM

## 2022-09-29 PROCEDURE — 99214 OFFICE O/P EST MOD 30 MIN: CPT | Performed by: FAMILY MEDICINE

## 2022-09-29 RX ORDER — AMITRIPTYLINE HYDROCHLORIDE 50 MG/1
25 TABLET, FILM COATED ORAL
Qty: 30 TABLET | Refills: 0 | Status: SHIPPED | OUTPATIENT
Start: 2022-09-29 | End: 2022-10-18 | Stop reason: SDUPTHER

## 2022-09-29 NOTE — PROGRESS NOTES
Name: Nader Kingsley      : 1980      MRN: [id number]  Encounter Provider: Rohan Garcia MD  Encounter Date: 2022   Encounter department: [place] [address]  Psychophysiological insomnia  -     amitriptyline (ELAVIL) 50 mg tablet; Take 0 5 tablets (25 mg total) by mouth daily at bedtime    2  Anxiety  Assessment & Plan:  Here to address anxiety   Established diagnosis previously on Celexa   MARLYN 16 and PHQ9 14  Start Amitriptyline  Start with 25 mg then increase to 50 after 2 weeks   This would also help with insomnia  Reviewed potential S/E   Follow up in 4-6 weeks or sooner if s/e arise     Orders:  -     amitriptyline (ELAVIL) 50 mg tablet; Take 0 5 tablets (25 mg total) by mouth daily at bedtime           Subjective      Been feelig more depressd and anxious in the past 2 weeks  Had BBL and breast augmentation surgery 1 month ago in [place]  Still recovering and hasn't returned to work  Worried about her finances  Over thinking, not sleeping well, and is feeling sad  Sleep is also a concerned  Difficulty falling asleep  Tried ambien and [place] for sleep  Tried celexa and lexapro in the past      MARLYN-7 Flowsheet Screening    Flowsheet Row Most Recent Value   Over the last 2 weeks, how often have you been bothered by any of the   following problems?     Feeling nervous, anxious, or on edge 3   Not being able to stop or control worrying 3   Worrying too much about different things 3   Trouble relaxing 3   Being so restless that it is hard to sit still 3   Becoming easily annoyed or irritable 1   Feeling afraid as if something awful might happen 0   MARLYN-7 Total Score 16       PHQ-2/9 Depression Screening    Little interest or pleasure in doing things: 1 - several days  Feeling down, depressed, or hopeless: 3 - nearly every day  Trouble falling or staying asleep, or sleeping too much: 3 - nearly every   day  Feeling tired or having little energy: 0 - not at all  Poor appetite or overeating: 3 - nearly every day  Feeling bad about yourself - or that you are a failure or have let   yourself or your family down: 1 - several days  Trouble concentrating on things, such as reading the newspaper or watching   television: 3 - nearly every day  Moving or speaking so slowly that other people could have noticed  Or the   opposite - being so fidgety or restless that you have been moving around a   lot more than usual: 0 - not at all  Thoughts that you would be better off dead, or of hurting yourself in some   way: 0 - not at all  PHQ-2 Score: 4  PHQ-2 Interpretation: POSITIVE depression screen  PHQ-9 Score: 14   PHQ-9 Interpretation: Moderate depression               Review of Systems    Current Outpatient Medications on File Prior to Visit   Medication Sig    ascorbic acid (VITAMIN C) 500 MG tablet Take 500 mg by mouth daily    CALCIUM CARBONATE-VITAMIN D PO Take 1 tablet by mouth    ferrous sulfate 325 (65 Fe) mg tablet Take 325 mg by mouth daily with breakfast    folic acid (FOLVITE) 891 mcg tablet Take 400 mcg by mouth daily    Sodium Fluoride 5000 PPM 1 1 % PSTE BRUSH IN EVENING FOR 2 MINTUES, EXPECTORATE, DO NOT RINSE  Objective     /80 (BP Location: Left arm, Patient Position: Sitting, Cuff Size: Standard)   Pulse 75   Temp 97 9 °F (36 6 °C) (Tympanic)   Resp 14   Ht 5' 3 75" (1 619 m)   Wt 64 kg (141 lb 3 2 oz)   SpO2 100%   BMI 24 43 kg/m²     Physical Exam  Vitals reviewed  Constitutional:       General: She is not in acute distress  Appearance: Normal appearance  She is not ill-appearing  HENT:      Head: Normocephalic and atraumatic  Cardiovascular:      Rate and Rhythm: Normal rate and regular rhythm  Heart sounds: No murmur heard  Pulmonary:      Effort: Pulmonary effort is normal       Breath sounds: Normal breath sounds     Chest:      Comments: Periareolar incisions clean dry and intact  Neurological:      Mental Status: She is alert and oriented to person, place, and time  Psychiatric:         Mood and Affect: Mood normal          Behavior: Behavior normal          Thought Content: Thought content normal        I have spent 25 minutes with Patient  today in which greater than 50% of this time was spent in counseling/coordination of care regarding Intructions for management, Patient and family education, Importance of tx compliance and Impressions            Duane Gayle MD

## 2022-10-02 NOTE — ASSESSMENT & PLAN NOTE
Here to address anxiety   Established diagnosis previously on Celexa   MARLYN 16 and PHQ9 14  Start Amitriptyline     Start with 25 mg then increase to 50 after 2 weeks   This would also help with insomnia  Reviewed potential S/E   Follow up in 4-6 weeks or sooner if s/e arise

## 2022-10-12 PROBLEM — Z11.1 SCREENING-PULMONARY TB: Status: RESOLVED | Noted: 2022-02-09 | Resolved: 2022-10-12

## 2022-10-18 ENCOUNTER — OFFICE VISIT (OUTPATIENT)
Dept: FAMILY MEDICINE CLINIC | Facility: CLINIC | Age: 42
End: 2022-10-18
Payer: COMMERCIAL

## 2022-10-18 VITALS
RESPIRATION RATE: 16 BRPM | TEMPERATURE: 97.2 F | SYSTOLIC BLOOD PRESSURE: 102 MMHG | OXYGEN SATURATION: 98 % | HEIGHT: 64 IN | BODY MASS INDEX: 24.14 KG/M2 | DIASTOLIC BLOOD PRESSURE: 70 MMHG | WEIGHT: 141.4 LBS | HEART RATE: 82 BPM

## 2022-10-18 DIAGNOSIS — F51.04 PSYCHOPHYSIOLOGICAL INSOMNIA: ICD-10-CM

## 2022-10-18 DIAGNOSIS — F41.9 ANXIETY: ICD-10-CM

## 2022-10-18 DIAGNOSIS — R22.2 ABDOMINAL WALL MASS: Primary | ICD-10-CM

## 2022-10-18 PROCEDURE — 99214 OFFICE O/P EST MOD 30 MIN: CPT | Performed by: FAMILY MEDICINE

## 2022-10-18 RX ORDER — ESZOPICLONE 1 MG/1
1 TABLET, FILM COATED ORAL
Qty: 30 TABLET | Refills: 1 | Status: SHIPPED | OUTPATIENT
Start: 2022-10-18

## 2022-10-18 RX ORDER — CITALOPRAM 10 MG/1
10 TABLET ORAL DAILY
Qty: 30 TABLET | Refills: 2 | Status: SHIPPED | OUTPATIENT
Start: 2022-10-18

## 2022-10-18 RX ORDER — AMITRIPTYLINE HYDROCHLORIDE 100 MG/1
100 TABLET, FILM COATED ORAL
Qty: 30 TABLET | Refills: 2 | Status: SHIPPED | OUTPATIENT
Start: 2022-10-18 | End: 2022-10-18

## 2022-10-18 NOTE — PROGRESS NOTES
Name: Watson Quintero      : 1980      MRN: [id number]  Encounter Provider: Anjali Rubalcava MD  Encounter Date: 10/18/2022   Encounter department: [facility] today with a mass on the LUQ  S/p abdominoplasty 2 months ago  Area is mildly tender  Unlikely a hernia  Tissue feels indurated  Will resolve with time  Recommend warm compress  Plans to get lymphatic massage tomorrow  In terms of sleep and anxiety  Elavil ineffective  Celexa and lunesta worked well in the past  Start both at low doses  Follow up in 1 month    1  Abdominal wall mass    2  Psychophysiological insomnia  -     eszopiclone (LUNESTA) 1 mg tablet; Take 1 tablet (1 mg total) by mouth daily at bedtime as needed for sleep Take immediately before bedtime    3  Anxiety  -     citalopram (CeleXA) 10 mg tablet; Take 1 tablet (10 mg total) by mouth daily         Subjective      36year old female here to evaluate a lump on the left upper abdomen  She noticed it on   Mildly tender  No bruising or known injuries  Had abdominoplasty 2 months ago  Applying lidocaine patches and taking tylenol  Was getting massages  Last ome was 2 weeks ago and is scheduled to get another on tomorrow  No recent changes in BM  Also requesting to start celexa  Previosuly on this med  Feeling more anxious  Also doesn't feel like the amitriptyline is working  Lunesta worked well in the past     Review of Systems   Gastrointestinal: Negative for constipation, diarrhea, nausea and vomiting  Abdominal wall pain on the LUQ  approx 5 cm area of induration underneath the rib near the xyphoid process    Skin: Negative for rash and wound         Current Outpatient Medications on File Prior to Visit   Medication Sig   • ascorbic acid (VITAMIN C) 500 MG tablet Take 500 mg by mouth daily   • CALCIUM CARBONATE-VITAMIN D PO Take 1 tablet by mouth   • ferrous sulfate 325 (65 Fe) mg tablet Take 325 mg by mouth daily with breakfast   • folic acid (FOLVITE) 178 mcg tablet Take 400 mcg by mouth daily   • Sodium Fluoride 5000 PPM 1 1 % PSTE BRUSH IN EVENING FOR 2 MINTUES, EXPECTORATE, DO NOT RINSE  • [DISCONTINUED] amitriptyline (ELAVIL) 50 mg tablet Take 0 5 tablets (25 mg total) by mouth daily at bedtime       Objective     /70 (BP Location: Left arm, Patient Position: Sitting, Cuff Size: Standard)   Pulse 82   Temp (!) 97 2 °F (36 2 °C) (Temporal)   Resp 16   Ht 5' 3 75" (1 619 m)   Wt 64 1 kg (141 lb 6 4 oz)   SpO2 98%   BMI 24 46 kg/m²     Physical Exam  Constitutional:       Appearance: Normal appearance  HENT:      Head: Normocephalic and atraumatic  Eyes:      Extraocular Movements: Extraocular movements intact  Abdominal:      General: Abdomen is flat  A surgical scar is present  Bowel sounds are normal  There is no distension  Palpations: Abdomen is soft  Hernia: No hernia is present  Comments: Induration in the left upper quandrant underneath the ribs  Mildly tender  No overlying skin changes    Neurological:      Mental Status: She is alert     Psychiatric:         Mood and Affect: Mood normal          Behavior: Behavior normal          Jacinta Barrett MD

## 2022-10-22 ENCOUNTER — OFFICE VISIT (OUTPATIENT)
Dept: URGENT CARE | Age: 42
End: 2022-10-22
Payer: COMMERCIAL

## 2022-10-22 VITALS — OXYGEN SATURATION: 99 % | RESPIRATION RATE: 14 BRPM | HEART RATE: 92 BPM | TEMPERATURE: 97.8 F

## 2022-10-22 DIAGNOSIS — Z51.89 VISIT FOR WOUND CHECK: Primary | ICD-10-CM

## 2022-10-22 PROCEDURE — 99213 OFFICE O/P EST LOW 20 MIN: CPT

## 2022-10-22 NOTE — PATIENT INSTRUCTIONS
Please monitor incision site for signs of infection including swelling, redness, drainage that is green or yellow, tenderness, fevers or chills  If this occurs, please seek care immediately  Please contact your primary care provider as soon as possible for further evaluation

## 2022-10-22 NOTE — PROGRESS NOTES
[address]        NAME: Emi Reyes is a 36 y o  female  : 1980    MRN: [id number]  DATE: 2022  TIME: 3:13 PM    Assessment and Plan   Visit for wound check [Z51 89]  1  Visit for wound check         Discussed with patient that there are no signs of infection to surgical site  Patient to follow-up with surgeon or primary care provider for further evaluation of drainage from right breast   Patient Instructions     Follow up with PCP or surgeon  Follow up with PCP in 3-5 days  Proceed to  ER if symptoms worsen  Chief Complaint     Chief Complaint   Patient presents with   • Breast Problem     Drainage/discharge from right breast; breat implants placed 2 months ago         History of Present Illness       Patient presenting for evaluation of drainage from right breast   Patient states that she had breast augmentation on 2022  She states that since surgery she has noticed that her right incision is not healing and she has noticed clear drainage form the incision site  She denies noticing any purulent drainage from the area  She denies any fevers, chills, tenderness over the right breast, nipple discharge or erythema  She denies any trauma or injury to her right breast   Patient states that she contacted the surgeon who did her procedure, and he recommended that the patient to be evaluated and ultrasound be completed  Review of Systems   Review of Systems   Constitutional: Negative for chills and fever  HENT: Negative for ear pain and sore throat  Eyes: Negative for pain and visual disturbance  Respiratory: Negative for cough and shortness of breath  Cardiovascular: Negative for chest pain and palpitations  Gastrointestinal: Negative for abdominal pain and vomiting  Genitourinary: Negative for dysuria and hematuria  Musculoskeletal: Negative for arthralgias and back pain  Skin: Positive for wound  Negative for color change and rash  Neurological: Negative for seizures and syncope  All other systems reviewed and are negative  Current Medications       Current Outpatient Medications:   •  ascorbic acid (VITAMIN C) 500 MG tablet, Take 500 mg by mouth daily, Disp: , Rfl:   •  CALCIUM CARBONATE-VITAMIN D PO, Take 1 tablet by mouth, Disp: , Rfl:   •  citalopram (CeleXA) 10 mg tablet, Take 1 tablet (10 mg total) by mouth daily, Disp: 30 tablet, Rfl: 2  •  eszopiclone (LUNESTA) 1 mg tablet, Take 1 tablet (1 mg total) by mouth daily at bedtime as needed for sleep Take immediately before bedtime, Disp: 30 tablet, Rfl: 1  •  ferrous sulfate 325 (65 Fe) mg tablet, Take 325 mg by mouth daily with breakfast, Disp: , Rfl:   •  folic acid (FOLVITE) 901 mcg tablet, Take 400 mcg by mouth daily, Disp: , Rfl:   •  Sodium Fluoride 5000 PPM 1 1 % PSTE, BRUSH IN EVENING FOR 2 MINTUES, EXPECTORATE, DO NOT RINSE , Disp: , Rfl:   No current facility-administered medications for this visit      Facility-Administered Medications Ordered in Other Visits:   •  lidocaine-epinephrine (XYLOCAINE/EPINEPHRINE) 1 %-1:100,000 20 mL, sodium bicarbonate 2 mEq infiltration, , Infiltration, Once, Olena Workman MD    Current Allergies     Allergies as of 10/22/2022   • (No Known Allergies)            The following portions of the patient's history were reviewed and updated as appropriate: allergies, current medications, past family history, past medical history, past social history, past surgical history and problem list      Past Medical History:   Diagnosis Date   • Asthma     Last Assessed [id number]   • BRCA1 negative    • BRCA2 negative    • Depression        Past Surgical History:   Procedure Laterality Date   • ABDOMINOPLASTY     • OOPHORECTOMY N/A     Lateral       Family History   Problem Relation Age of Onset   • Depression Mother    • Alcohol abuse Father    • Kidney disease Father    • No Known Problems Sister    • No Known Problems Sister    • Cataracts Maternal Grandmother    • Hyperlipidemia Maternal Grandmother    • Breast cancer Maternal Grandmother 72   • Diabetes Maternal Grandfather    • No Known Problems Paternal Grandmother    • No Known Problems Paternal Grandfather    • No Known Problems Son    • No Known Problems Maternal Aunt    • No Known Problems Maternal Aunt    • Cancer Maternal Uncle 79        liver   • No Known Problems Paternal Aunt    • No Known Problems Paternal Aunt    • No Known Problems Paternal Aunt    • No Known Problems Paternal Aunt          Medications have been verified  Objective   Pulse 92   Temp 97 8 °F (36 6 °C) (Temporal)   Resp 14   SpO2 99%        Physical Exam     Physical Exam  Vitals and nursing note reviewed  Constitutional:       General: She is not in acute distress  Appearance: Normal appearance  She is normal weight  She is not ill-appearing, toxic-appearing or diaphoretic  HENT:      Head: Normocephalic and atraumatic  Right Ear: Tympanic membrane normal       Left Ear: Tympanic membrane normal       Nose: Nose normal  No congestion or rhinorrhea  Mouth/Throat:      Mouth: Mucous membranes are moist       Pharynx: Oropharynx is clear  No oropharyngeal exudate or posterior oropharyngeal erythema  Eyes:      General:         Right eye: No discharge  Left eye: No discharge  Extraocular Movements: Extraocular movements intact  Conjunctiva/sclera: Conjunctivae normal       Pupils: Pupils are equal, round, and reactive to light  Cardiovascular:      Rate and Rhythm: Normal rate and regular rhythm  Pulses: Normal pulses  Heart sounds: Normal heart sounds  No murmur heard  No friction rub  No gallop  Pulmonary:      Effort: Pulmonary effort is normal  No respiratory distress  Breath sounds: Normal breath sounds  No stridor  No wheezing, rhonchi or rales  Chest:      Chest wall: No tenderness  Abdominal:      General: Abdomen is flat   Bowel sounds are normal       Palpations: Abdomen is soft  Tenderness: There is no abdominal tenderness  There is no guarding or rebound  Musculoskeletal:         General: No tenderness  Normal range of motion  Cervical back: Normal range of motion and neck supple  No tenderness  Skin:     General: Skin is warm and dry  Capillary Refill: Capillary refill takes less than 2 seconds  Neurological:      General: No focal deficit present  Mental Status: She is alert and oriented to person, place, and time     Psychiatric:         Mood and Affect: Mood normal          Behavior: Behavior normal

## 2022-11-01 ENCOUNTER — OFFICE VISIT (OUTPATIENT)
Dept: FAMILY MEDICINE CLINIC | Facility: CLINIC | Age: 42
End: 2022-11-01

## 2022-11-01 VITALS
TEMPERATURE: 98 F | WEIGHT: 142.4 LBS | SYSTOLIC BLOOD PRESSURE: 116 MMHG | RESPIRATION RATE: 16 BRPM | HEIGHT: 64 IN | BODY MASS INDEX: 24.31 KG/M2 | DIASTOLIC BLOOD PRESSURE: 80 MMHG | OXYGEN SATURATION: 100 % | HEART RATE: 71 BPM

## 2022-11-01 DIAGNOSIS — S21.009A INCISIONAL BREAST WOUND: Primary | ICD-10-CM

## 2022-11-01 DIAGNOSIS — F51.04 PSYCHOPHYSIOLOGICAL INSOMNIA: ICD-10-CM

## 2022-11-01 DIAGNOSIS — F41.9 ANXIETY: ICD-10-CM

## 2022-11-01 RX ORDER — CITALOPRAM 20 MG/1
20 TABLET ORAL DAILY
Qty: 30 TABLET | Refills: 2 | Status: SHIPPED | OUTPATIENT
Start: 2022-11-01

## 2022-11-01 RX ORDER — ESZOPICLONE 2 MG/1
2 TABLET, FILM COATED ORAL
Qty: 30 TABLET | Refills: 0 | Status: SHIPPED | OUTPATIENT
Start: 2022-11-01

## 2022-11-02 ENCOUNTER — PATIENT MESSAGE (OUTPATIENT)
Dept: FAMILY MEDICINE CLINIC | Facility: CLINIC | Age: 42
End: 2022-11-02

## 2022-11-02 DIAGNOSIS — N64.4 MASTALGIA: Primary | ICD-10-CM

## 2022-11-02 DIAGNOSIS — Z98.82 S/P BREAST AUGMENTATION: ICD-10-CM

## 2022-11-02 PROBLEM — S21.009A INCISIONAL BREAST WOUND: Status: ACTIVE | Noted: 2022-11-02

## 2022-11-02 NOTE — PROGRESS NOTES
Name: Clarice Leigh      : 1980      MRN: [id number]  Encounter Provider: Lata Miller MD  Encounter Date: 2022   Encounter department: [facility]     1  Incisional breast wound  Assessment & Plan:  [place]   Irritation and pain noted a few days ago and was evaluated a the   No evidence of active infection but there is an area underneath the breast that is atrophied and midly erythematous   Asked to avoid topical steroids as this can slow healing and cause atrophy   May continue to apply bacitracin       2  Anxiety  Assessment & Plan:  Here to follow up anxiety   Established diagnosis previously on Celexa   Also tried amitriptyline but was ineffective   Celexa restarted 1 month ago   Partial response reported   Increase to 40 mg daily      Orders:  -     citalopram (CeleXA) 20 mg tablet; Take 1 tablet (20 mg total) by mouth daily    3  Psychophysiological insomnia  Assessment & Plan:  Improved on 1 mg but is still having difficulty falling asleep  Increase lunesta to 2 mg daily   PDMP queried     Orders:  -     eszopiclone (LUNESTA) 2 mg tablet; Take 1 tablet (2 mg total) by mouth daily at bedtime as needed for sleep Take immediately before bedtime           Subjective      Here for 1 month follow up  Improvement on Celexa 10 but requesting a higher dose  Abielselvin Maryan has helped with sleep maintence but is having difficulty falling asleep  Went to the  recently for pain and irritation under the right breast  2 months ago she underwent abdominoplasty and breast augmentation  There were no concerns for dehiscence or infection per the patient  She has been apply cortisone and bacitracin  She also noticed some drainage along the lower abdominal incision     Review of Systems   Constitutional: Negative for fever  Respiratory: Negative  Cardiovascular: Negative  Gastrointestinal: Negative      Skin: Positive for wound  Psychiatric/Behavioral: Positive for sleep disturbance  Negative for agitation and behavioral problems  The patient is not nervous/anxious (improving )  Current Outpatient Medications on File Prior to Visit   Medication Sig   • ascorbic acid (VITAMIN C) 500 MG tablet Take 500 mg by mouth daily   • CALCIUM CARBONATE-VITAMIN D PO Take 1 tablet by mouth   • ferrous sulfate 325 (65 Fe) mg tablet Take 325 mg by mouth daily with breakfast   • folic acid (FOLVITE) 311 mcg tablet Take 400 mcg by mouth daily   • Sodium Fluoride 5000 PPM 1 1 % PSTE BRUSH IN EVENING FOR 2 MINTUES, EXPECTORATE, DO NOT RINSE  Objective     /80 (BP Location: Left arm, Patient Position: Sitting, Cuff Size: Adult)   Pulse 71   Temp 98 °F (36 7 °C) (Tympanic)   Resp 16   Ht 5' 3 75" (1 619 m)   Wt 64 6 kg (142 lb 6 4 oz)   SpO2 100%   BMI 24 63 kg/m²     Physical Exam  Vitals reviewed  Constitutional:       General: She is not in acute distress  Appearance: Normal appearance  She is not ill-appearing  HENT:      Head: Normocephalic and atraumatic  Eyes:      Extraocular Movements: Extraocular movements intact  Pulmonary:      Effort: Pulmonary effort is normal  No respiratory distress  Skin:     Comments: Scant milky discharge from the abdominal incision  No erythema or fluctuance  Non tender     2 cm of mild erythema along the right breast incision    Neurological:      General: No focal deficit present  Mental Status: She is alert and oriented to person, place, and time  Psychiatric:         Mood and Affect: Mood normal          Behavior: Behavior normal          Thought Content:  Thought content normal        Benito Cardoza MD

## 2022-11-02 NOTE — ASSESSMENT & PLAN NOTE
Improved on 1 mg but is still having difficulty falling asleep  Increase lunesta to 2 mg daily   PDMP queried

## 2022-11-02 NOTE — ASSESSMENT & PLAN NOTE
Here to follow up anxiety   Established diagnosis previously on Celexa   Also tried amitriptyline but was ineffective   Celexa restarted 1 month ago   Partial response reported   Increase to 40 mg daily

## 2022-11-02 NOTE — ASSESSMENT & PLAN NOTE
[place]   Irritation and pain noted a few days ago and was evaluated a the UC  No evidence of active infection but there is an area underneath the breast that is atrophied and midly erythematous   Asked to avoid topical steroids as this can slow healing and cause atrophy   May continue to apply bacitracin

## 2022-11-03 ENCOUNTER — PATIENT MESSAGE (OUTPATIENT)
Dept: FAMILY MEDICINE CLINIC | Facility: CLINIC | Age: 42
End: 2022-11-03

## 2022-11-03 DIAGNOSIS — Z98.82 S/P BREAST AUGMENTATION: ICD-10-CM

## 2022-11-03 DIAGNOSIS — N64.4 MASTALGIA: ICD-10-CM

## 2022-11-03 DIAGNOSIS — S21.009A INCISIONAL BREAST WOUND: Primary | ICD-10-CM

## 2022-11-07 ENCOUNTER — HOSPITAL ENCOUNTER (OUTPATIENT)
Dept: ULTRASOUND IMAGING | Facility: CLINIC | Age: 42
Discharge: HOME | End: 2022-11-07

## 2022-11-07 VITALS — WEIGHT: 142 LBS | HEIGHT: 63 IN | BODY MASS INDEX: 25.16 KG/M2

## 2022-11-07 DIAGNOSIS — Z98.82 S/P BREAST AUGMENTATION: ICD-10-CM

## 2022-11-07 DIAGNOSIS — Z09 FOLLOW-UP EXAM, 3-6 MONTHS SINCE PREVIOUS EXAM: ICD-10-CM

## 2022-11-07 DIAGNOSIS — N64.4 MASTALGIA: ICD-10-CM

## 2022-11-07 DIAGNOSIS — S21.009A INCISIONAL BREAST WOUND: ICD-10-CM

## 2022-12-30 DIAGNOSIS — F51.04 PSYCHOPHYSIOLOGICAL INSOMNIA: ICD-10-CM

## 2022-12-30 RX ORDER — ESZOPICLONE 2 MG/1
2 TABLET, FILM COATED ORAL
Qty: 30 TABLET | Refills: 0 | Status: SHIPPED | OUTPATIENT
Start: 2022-12-30

## 2022-12-30 NOTE — TELEPHONE ENCOUNTER
Medication:eszopiclone (LUNESTA) 2 mg tablet       Dosage:  How Often:eszopiclone (LUNESTA) 2 mg tablet       Quantity:  30  Last Office Visit:  11/1/22  Next Office Visit: 1/4/23  Last refilled:11/1/22  How many pills left: 0  Pharmacy:   [address] [place] [address] [id number]in Casillas[address]  Phone: [phone] Fax: [phone]

## 2022-12-30 NOTE — TELEPHONE ENCOUNTER
Medication:  PDMP    11/02/2022 11/01/2022 Eszopiclone (Tablet)  30 0 30 2 MG NA DOMINICK EPSTEIN      Active agreement on file -No

## 2023-01-09 ENCOUNTER — OFFICE VISIT (OUTPATIENT)
Dept: FAMILY MEDICINE CLINIC | Facility: CLINIC | Age: 43
End: 2023-01-09

## 2023-01-09 VITALS
HEART RATE: 74 BPM | RESPIRATION RATE: 16 BRPM | HEIGHT: 63 IN | TEMPERATURE: 99.1 F | SYSTOLIC BLOOD PRESSURE: 102 MMHG | OXYGEN SATURATION: 99 % | DIASTOLIC BLOOD PRESSURE: 72 MMHG | WEIGHT: 149.2 LBS | BODY MASS INDEX: 26.44 KG/M2

## 2023-01-09 DIAGNOSIS — R23.3 EASY BRUISABILITY: Primary | ICD-10-CM

## 2023-01-09 DIAGNOSIS — Z87.898 HISTORY OF MOTION SICKNESS: ICD-10-CM

## 2023-01-09 RX ORDER — SCOLOPAMINE TRANSDERMAL SYSTEM 1 MG/1
1 PATCH, EXTENDED RELEASE TRANSDERMAL
Qty: 4 PATCH | Refills: 0 | Status: SHIPPED | OUTPATIENT
Start: 2023-01-09

## 2023-01-09 NOTE — PROGRESS NOTES
Name: Claudia Coronado      : 1980      MRN: [id number]  Encounter Provider: Kylee Terry MD  Encounter Date: 2023   Encounter department: [place] [address]  Easy bruisability  Comments:  Noted in the past month but has had heavy periods and anemia for years  May be d/t an underlying bleeding ds vs iatrogenic 2/2 SSRI  Will check labs   Orders:  -     CBC and differential; Future  -     APTT; Future  -     Protime-INR; Future  -     Iron Panel (Includes Ferritin, Iron Sat%, Iron, and TIBC); Future  -     Coagulation Profile; Future  -     [facility] [employer] SCREEN; Future    2  History of motion sickness  Comments:  7 day cruise  Scopalamine patch ordered  Replace q72hrs  Reviewed potential s/e such as urinary retention  If it occurs, she should remove it immediately  Orders:  -     scopolamine (TRANSDERM-SCOP) 1 mg/3 days TD 72 hr patch; Place 1 patch on the skin every third day         Subjective      Pt reports easy bruising in the lower extremities for the past month   Will notice random bruising on the legs even with scratching  Pt has a history of anemia and heavy periods  Periods last 5 days and the first 3 days are heavy (5-6 pads/day)   Sometimes clots   Taking chlorella, tumeric, and macka OTC   Also suffered with post partum hemorrhage after her son and had to remain in the hospital for 2 weeks where she received several untis of blood and iron    Denies a FH of bleeding ds   Pt also going on a cruise for 7 days and has a history of motion sickness  Requesting medications to help alleviate symptoms         Review of Systems   Genitourinary: Positive for menstrual problem  Negative for hematuria  Skin: Positive for color change         Current Outpatient Medications on File Prior to Visit   Medication Sig   • ascorbic acid (VITAMIN C) 500 MG tablet Take 500 mg by mouth daily   • CALCIUM CARBONATE-VITAMIN D PO Take 1 tablet by mouth   • citalopram (CeleXA) 20 mg tablet Take 1 tablet (20 mg total) by mouth daily   • eszopiclone (LUNESTA) 2 mg tablet Take 1 tablet (2 mg total) by mouth daily at bedtime as needed for sleep Take immediately before bedtime   • ferrous sulfate 325 (65 Fe) mg tablet Take 325 mg by mouth daily with breakfast   • folic acid (FOLVITE) 566 mcg tablet Take 400 mcg by mouth daily   • Sodium Fluoride 5000 PPM 1 1 % PSTE BRUSH IN EVENING FOR 2 MINTUES, EXPECTORATE, DO NOT RINSE  Objective     /72 (BP Location: Left arm, Patient Position: Sitting, Cuff Size: Adult)   Pulse 74   Temp 99 1 °F (37 3 °C) (Axillary)   Resp 16   Ht 5' 3" (1 6 m)   Wt 67 7 kg (149 lb 3 2 oz)   SpO2 99%   BMI 26 43 kg/m²     Physical Exam  Vitals reviewed  Constitutional:       General: She is not in acute distress  Appearance: Normal appearance  She is not ill-appearing  HENT:      Head: Normocephalic and atraumatic  Skin:     General: Skin is warm  Findings: No bruising or erythema  Neurological:      Mental Status: She is alert and oriented to person, place, and time  Psychiatric:         Mood and Affect: Mood normal          Behavior: Behavior normal          I have spent 25 minutes with Patient  today in which greater than 50% of this time was spent in counseling/coordination of care regarding Risks and benefits of tx options, Intructions for management and Impressions          Alize Cooney MD

## 2023-01-25 NOTE — TELEPHONE ENCOUNTER
Dr Paul Cast patient saw you on 11/4 regarding trouble sleeping  She states that zolpidem (AMBIEN) 5 mg tablet is not helping       Please advise 8

## 2023-02-08 ENCOUNTER — TELEMEDICINE (OUTPATIENT)
Dept: FAMILY MEDICINE CLINIC | Facility: CLINIC | Age: 43
End: 2023-02-08

## 2023-02-08 DIAGNOSIS — R05.1 ACUTE COUGH: Primary | ICD-10-CM

## 2023-02-08 RX ORDER — AZITHROMYCIN 250 MG/1
TABLET, FILM COATED ORAL
Qty: 6 TABLET | Refills: 0 | Status: SHIPPED | OUTPATIENT
Start: 2023-02-08 | End: 2023-02-13

## 2023-02-08 NOTE — PROGRESS NOTES
COVID-19 Outpatient Progress Note    Assessment/Plan:    Problem List Items Addressed This Visit    None  Visit Diagnoses     Acute cough    -  Primary    Pt stable on exam   Treating with Azithromycin, OTC Cough/Cold Preps PRN, rest, and good PO hydration  Precautions given  Relevant Medications    azithromycin (ZITHROMAX) 250 mg tablet         Disposition:     After clarifying the patient's history, my suspicion for COVID-19 infection is very low  I have spent 15 minutes directly with the patient  Greater than 50% of this time was spent in counseling/coordination of care regarding: prognosis, risks and benefits of treatment options, instructions for management, patient and family education, importance of treatment compliance, risk factor reductions and impressions  Encounter provider: Santy Colin DO     Provider located at: [address] [address]     Recent Visits  No visits were found meeting these conditions  Showing recent visits within past 7 days and meeting all other requirements  Today's Visits  Date Type Provider Dept   02/08/23 Telemedicine Michael Melendez DO  Renu Morgan   Showing today's visits and meeting all other requirements  Future Appointments  No visits were found meeting these conditions  Showing future appointments within next 150 days and meeting all other requirements     This virtual check-in was done via [employer] and patient was informed that this is a secure, HIPAA-compliant platform  She agrees to proceed  Patient agrees to participate in a virtual check in via telephone or video visit instead of presenting to the office to address urgent/immediate medical needs  Patient is aware this is a billable service  She acknowledged consent and understanding of privacy and security of the video platform   The patient has agreed to participate and understands they can discontinue the visit at any time     After connecting through [facility], the patient was identified by name and date of birth  Gayla Ko was informed that this was a telemedicine visit and that the exam was being conducted confidentially over secure lines  My office door was closed  No one else was in the room  Gayla Ko acknowledged consent and understanding of privacy and security of the telemedicine visit  I informed the patient that I have reviewed her record in Epic and presented the opportunity for her to ask any questions regarding the visit today  The patient agreed to participate  Verification of patient location:  Patient is located in the following state in which I hold an active license: [place]    Subjective:   Gayla Ko is a 36 y o  female who is concerned about COVID-19  Patient's symptoms include fever, nasal congestion, cough and myalgias  Patient denies rhinorrhea, sore throat, shortness of breath, diarrhea and headaches  - Date of symptom onset: 2/5/2023      COVID-19 vaccination status: Fully vaccinated (primary series)    Exposure:   Contact with a person who is under investigation (PUI) for or who is positive for COVID-19 within the last 14 days?: No    Hospitalized recently for fever and/or lower respiratory symptoms?: No      Currently a healthcare worker that is involved in direct patient care?: No      Works in a special setting where the risk of COVID-19 transmission may be high? (this may include long-term care, correctional and senior living facilities; homeless shelters; assisted-living facilities and group homes ): No      Resident in a special setting where the risk of COVID-19 transmission may be high? (this may include long-term care, correctional and senior living facilities; homeless shelters; assisted-living facilities and group homes ): No      Coughing a lot at night; with phlegm production      No exposures to people with COV-19 or influenza known; works from home     Lab Results   Component Value Date    SARSCOV2 Negative 08/24/2022    SARSCOV2 Negative 08/12/2022       Review of Systems   Constitutional: Positive for fever  HENT: Positive for congestion  Negative for rhinorrhea and sore throat  Respiratory: Positive for cough  Negative for shortness of breath  Gastrointestinal: Negative for diarrhea  Musculoskeletal: Positive for myalgias  Neurological: Negative for headaches  Current Outpatient Medications on File Prior to Visit   Medication Sig   • ascorbic acid (VITAMIN C) 500 MG tablet Take 500 mg by mouth daily   • CALCIUM CARBONATE-VITAMIN D PO Take 1 tablet by mouth   • ferrous sulfate 325 (65 Fe) mg tablet Take 325 mg by mouth daily with breakfast   • folic acid (FOLVITE) 441 mcg tablet Take 400 mcg by mouth daily   • scopolamine (TRANSDERM-SCOP) 1 mg/3 days TD 72 hr patch Place 1 patch on the skin every third day   • Sodium Fluoride 5000 PPM 1 1 % PSTE BRUSH IN EVENING FOR 2 MINTUES, EXPECTORATE, DO NOT RINSE  • [DISCONTINUED] citalopram (CeleXA) 20 mg tablet Take 1 tablet (20 mg total) by mouth daily   • [DISCONTINUED] eszopiclone (LUNESTA) 2 mg tablet Take 1 tablet (2 mg total) by mouth daily at bedtime as needed for sleep Take immediately before bedtime       Objective: There were no vitals taken for this visit  Physical Exam  Nursing note reviewed  Constitutional:       General: She is not in acute distress  Appearance: Normal appearance  She is not ill-appearing, toxic-appearing or diaphoretic  HENT:      Head: Normocephalic and atraumatic  Eyes:      General: No scleral icterus  Conjunctiva/sclera: Conjunctivae normal    Pulmonary:      Effort: Pulmonary effort is normal  No respiratory distress  Neurological:      Mental Status: She is alert and oriented to person, place, and time  Psychiatric:         Mood and Affect: Mood normal          Behavior: Behavior normal          Thought Content:  Thought content normal          Judgment: Judgment normal           Luke Artist was seen today for cough, nasal congestion, fever and covid-19  Diagnoses and all orders for this visit:    Acute cough  Comments:  Pt stable on exam   Treating with Azithromycin, OTC Cough/Cold Preps PRN, rest, and good PO hydration  Precautions given  Orders:  -     azithromycin (ZITHROMAX) 250 mg tablet; Take 2 tablets by mouth on day #1, then 1 tab daily for four more days          [address],

## 2023-03-03 ENCOUNTER — ANNUAL EXAM (OUTPATIENT)
Dept: OBGYN CLINIC | Facility: CLINIC | Age: 43
End: 2023-03-03

## 2023-03-03 VITALS
SYSTOLIC BLOOD PRESSURE: 93 MMHG | DIASTOLIC BLOOD PRESSURE: 60 MMHG | HEART RATE: 80 BPM | WEIGHT: 150.4 LBS | BODY MASS INDEX: 26.65 KG/M2 | HEIGHT: 63 IN

## 2023-03-03 DIAGNOSIS — Z12.31 ENCOUNTER FOR SCREENING MAMMOGRAM FOR MALIGNANT NEOPLASM OF BREAST: ICD-10-CM

## 2023-03-03 DIAGNOSIS — Z12.4 CERVICAL CANCER SCREENING: ICD-10-CM

## 2023-03-03 DIAGNOSIS — Z11.3 SCREEN FOR STD (SEXUALLY TRANSMITTED DISEASE): ICD-10-CM

## 2023-03-03 DIAGNOSIS — Z01.419 WOMEN'S ANNUAL ROUTINE GYNECOLOGICAL EXAMINATION: Primary | ICD-10-CM

## 2023-03-03 NOTE — PROGRESS NOTES
ANNUAL GYNECOLOGICAL EXAMINATION    Vero Norris is a 36 y o  female who presents today for annual GYN exam   Her last pap smear was performed 2021 and result was NILM, HR-HPV negative  She reports a history of one abnormal pap smears in her past in her early 25s with all normal follow up  Her last mammogram was performed 2022 and result was BI-RADS 1 for the right breast, the left breast needed follow up US for more imaging which resulted BI-RADS 3  She is scheduled for 6 month follow up US for the left breast  She reports menses as normal   Patient's last menstrual period was 2023 (approximate)  Her general medical history has been reviewed and she reports it as follows:    Past Medical History:   Diagnosis Date   • Asthma     Last Assessed [id number]   • BRCA1 negative    • BRCA2 negative    • Depression      Past Surgical History:   Procedure Laterality Date   • ABDOMINOPLASTY     • AUGMENTATION MAMMAPLASTY Bilateral     silicone   • OOPHORECTOMY N/A     Lateral     OB History        1    Para   1    Term   1            AB        Living   1       SAB        IAB        Ectopic        Multiple        Live Births                   Social History     Tobacco Use   • Smoking status: Never   • Smokeless tobacco: Never   Vaping Use   • Vaping Use: Never used   Substance Use Topics   • Alcohol use: Yes     Comment: social   • Drug use: No     Social History     Substance and Sexual Activity   Sexual Activity Not Currently   • Partners: Male   • Birth control/protection: None     Cancer-related family history includes Breast cancer (age of onset: 67) in her maternal grandmother; Cancer (age of onset: 79) in her maternal uncle      Current Outpatient Medications   Medication Instructions   • ascorbic acid (VITAMIN C) 500 mg, Oral, Daily   • CALCIUM CARBONATE-VITAMIN D PO 1 tablet, Oral   • ferrous sulfate 325 mg, Oral, Daily with breakfast   • folic acid (FOLVITE) 923 mcg, Oral, Daily   • Sodium Fluoride 5000 PPM 1 1 % PSTE BRUSH IN EVENING FOR 2 MINTUES, EXPECTORATE, DO NOT RINSE  Review of Systems:  Review of Systems   Constitutional: Negative  Gastrointestinal: Negative  Genitourinary: Negative  Skin: Negative  Physical Exam:  BP 93/60 (BP Location: Right arm, Patient Position: Sitting, Cuff Size: Adult)   Pulse 80   Ht 5' 3" (1 6 m)   Wt 68 2 kg (150 lb 6 4 oz)   LMP 02/07/2023 (Approximate)   BMI 26 64 kg/m²   Physical Exam  Constitutional:       General: She is not in acute distress  Appearance: Normal appearance  Genitourinary:      Vulva and bladder normal       No lesions in the vagina  No vaginal erythema or ulceration  Right Adnexa: not tender and no mass present  Left Adnexa: not tender and no mass present  No cervical motion tenderness or lesion  Uterus is not enlarged or tender  No uterine mass detected  Breasts:     Right: Breast implant present  No mass, nipple discharge or skin change  Left: Breast implant present  No mass, nipple discharge or skin change  Cardiovascular:      Rate and Rhythm: Normal rate and regular rhythm  Pulmonary:      Effort: Pulmonary effort is normal       Breath sounds: Normal breath sounds  Abdominal:      General: Abdomen is flat  Palpations: Abdomen is soft  Musculoskeletal:      Cervical back: Neck supple  Neurological:      Mental Status: She is alert  Skin:     General: Skin is warm and dry  Psychiatric:         Mood and Affect: Mood normal          Behavior: Behavior normal    Vitals reviewed  Assessment/Plan:   1  Normal well-woman GYN exam   2  Cervical cancer screening:  Normal cervical exam   Pap smear is up to date  Reviewed ASCCP guidelines for recommendation for pap every 5 years  She desires to have a pap smear done today for reassurance  Pap smear done with HPV co-testing     3  STD screening: Orders placed for vaginal GC/CT cultures  4  Breast cancer screening:  Normal breast exam   Order placed for bilateral screening mammogram  [facility] in May  Reviewed breast self-awareness  5  Depression Screening: Patient's depression screening was assessed with a PHQ-2 score of 0  Their PHQ-9 score was 2  Clinically patient does not have depression  No treatment is required  6  BMI Counseling: Body mass index is 26 64 kg/m²  Discussed the patient's BMI with her  The BMI is normal     7  Contraception:  Declines    8  Return to office in one year for annual exam or sooner if needed  Reviewed with patient that test results are available in [facility] immediately, but that they will not necessarily be reviewed by me immediately  Explained that I will review results at my earliest opportunity and contact patient appropriately

## 2023-03-04 LAB
C TRACH DNA SPEC QL NAA+PROBE: NEGATIVE
N GONORRHOEA DNA SPEC QL NAA+PROBE: NEGATIVE

## 2023-03-09 ENCOUNTER — APPOINTMENT (OUTPATIENT)
Dept: LAB | Facility: CLINIC | Age: 43
End: 2023-03-09

## 2023-03-09 DIAGNOSIS — R23.3 EASY BRUISABILITY: ICD-10-CM

## 2023-03-09 LAB
APTT PPP: 26 SECONDS (ref 23–37)
BASOPHILS # BLD AUTO: 0.03 THOUSANDS/ÂΜL (ref 0–0.1)
BASOPHILS NFR BLD AUTO: 1 % (ref 0–1)
EOSINOPHIL # BLD AUTO: 0.18 THOUSAND/ÂΜL (ref 0–0.61)
EOSINOPHIL NFR BLD AUTO: 3 % (ref 0–6)
ERYTHROCYTE [DISTWIDTH] IN BLOOD BY AUTOMATED COUNT: 12.8 % (ref 11.6–15.1)
FERRITIN SERPL-MCNC: 50 NG/ML (ref 8–388)
FIBRINOGEN PPP-MCNC: 332 MG/DL (ref 227–495)
HCT VFR BLD AUTO: 37.5 % (ref 34.8–46.1)
HGB BLD-MCNC: 12.4 G/DL (ref 11.5–15.4)
IMM GRANULOCYTES # BLD AUTO: 0.01 THOUSAND/UL (ref 0–0.2)
IMM GRANULOCYTES NFR BLD AUTO: 0 % (ref 0–2)
INR PPP: 0.85 (ref 0.84–1.19)
IRON SATN MFR SERPL: 18 % (ref 15–50)
IRON SERPL-MCNC: 64 UG/DL (ref 50–170)
LYMPHOCYTES # BLD AUTO: 1.7 THOUSANDS/ÂΜL (ref 0.6–4.47)
LYMPHOCYTES NFR BLD AUTO: 31 % (ref 14–44)
MCH RBC QN AUTO: 29.6 PG (ref 26.8–34.3)
MCHC RBC AUTO-ENTMCNC: 33.1 G/DL (ref 31.4–37.4)
MCV RBC AUTO: 90 FL (ref 82–98)
MONOCYTES # BLD AUTO: 0.5 THOUSAND/ÂΜL (ref 0.17–1.22)
MONOCYTES NFR BLD AUTO: 9 % (ref 4–12)
NEUTROPHILS # BLD AUTO: 2.99 THOUSANDS/ÂΜL (ref 1.85–7.62)
NEUTS SEG NFR BLD AUTO: 56 % (ref 43–75)
NRBC BLD AUTO-RTO: 0 /100 WBCS
PLATELET # BLD AUTO: 319 THOUSANDS/UL (ref 149–390)
PMV BLD AUTO: 10.3 FL (ref 8.9–12.7)
PROTHROMBIN TIME: 11.8 SECONDS (ref 11.6–14.5)
RBC # BLD AUTO: 4.19 MILLION/UL (ref 3.81–5.12)
THROMBIN TIME: 17.2 SECONDS (ref 14.7–18.4)
THROMBIN TIME: 17.2 SECONDS (ref 14.7–18.4)
TIBC SERPL-MCNC: 346 UG/DL (ref 250–450)
WBC # BLD AUTO: 5.41 THOUSAND/UL (ref 4.31–10.16)

## 2023-03-10 LAB
LAB AP GYN PRIMARY INTERPRETATION: NORMAL
Lab: NORMAL

## 2023-03-17 LAB — MISCELLANEOUS LAB TEST RESULT: NORMAL

## 2023-06-08 ENCOUNTER — OFFICE VISIT (OUTPATIENT)
Dept: OBGYN CLINIC | Facility: CLINIC | Age: 43
End: 2023-06-08

## 2023-06-08 VITALS
HEART RATE: 80 BPM | BODY MASS INDEX: 25.52 KG/M2 | WEIGHT: 144 LBS | HEIGHT: 63 IN | DIASTOLIC BLOOD PRESSURE: 79 MMHG | SYSTOLIC BLOOD PRESSURE: 112 MMHG

## 2023-06-08 DIAGNOSIS — Z30.09 BIRTH CONTROL COUNSELING: Primary | ICD-10-CM

## 2023-06-08 DIAGNOSIS — Z11.3 SCREEN FOR STD (SEXUALLY TRANSMITTED DISEASE): ICD-10-CM

## 2023-06-08 PROCEDURE — 99213 OFFICE O/P EST LOW 20 MIN: CPT | Performed by: NURSE PRACTITIONER

## 2023-06-08 NOTE — PROGRESS NOTES
"PROBLEM GYNECOLOGICAL VISIT    Kim Blizzard is a 39 y o  female who presents today to discuss contraception  Her general medical history has been reviewed and she reports it as follows:    Past Medical History:   Diagnosis Date   • Asthma     Last Assessed    • BRCA1 negative    • BRCA2 negative    • Depression      Past Surgical History:   Procedure Laterality Date   • ABDOMINOPLASTY     • AUGMENTATION MAMMAPLASTY Bilateral     silicone   • OOPHORECTOMY N/A     Lateral     OB History        2    Para   1    Term   1            AB   1    Living   1       SAB   1    IAB        Ectopic        Multiple        Live Births   1               Social History     Tobacco Use   • Smoking status: Never   • Smokeless tobacco: Never   Vaping Use   • Vaping Use: Never used   Substance Use Topics   • Alcohol use: Yes     Comment: social   • Drug use: No     Social History     Substance and Sexual Activity   Sexual Activity Yes   • Partners: Male   • Birth control/protection: None       Current Outpatient Medications   Medication Instructions   • ascorbic acid (VITAMIN C) 500 mg, Oral, Daily   • CALCIUM CARBONATE-VITAMIN D PO 1 tablet, Oral   • ferrous sulfate 325 mg, Oral, Daily with breakfast   • folic acid (FOLVITE) 162 mcg, Oral, Daily   • Sodium Fluoride 5000 PPM 1 1 % PSTE BRUSH IN EVENING FOR 2 MINTUES, EXPECTORATE, DO NOT RINSE  History of Present Illness:   Lucille Coley presents today to discuss contraception  She is not currently using any contraception  She does not desire any future pregnancies  She had previously been using Nuvaring but is interested in LARC due to convenience  Review of Systems:  Review of Systems   Constitutional: Negative  Gastrointestinal: Negative  Genitourinary: Negative          Physical Exam:  /79 (BP Location: Left arm, Patient Position: Sitting, Cuff Size: Standard)   Pulse 80   Ht 5' 3\" (1 6 m)   Wt 65 3 kg (144 lb)   LMP " 06/04/2023 (Exact Date)   BMI 25 51 kg/m²   Physical Exam  Constitutional:       General: She is not in acute distress  Appearance: Normal appearance  Neurological:      Mental Status: She is alert  Skin:     General: Skin is warm and dry  Psychiatric:         Mood and Affect: Mood normal          Behavior: Behavior normal    Vitals reviewed  Assessment:   1  Birth control counseling    2  STI screening     Plan:   1  Education given regarding options for contraception, including barrier methods, injectable contraception, IUD placement, oral contraceptives, nuvaring, nexplanon and contraceptive patch  2  She desires Paragard IUD  3  Reviewed risks/benefits, insertion/removal procedure and expected bleeding patterns  4  Order forms completed today  5  Patient is requesting hep B and hep C screening  Lab orders placed  6  Return for IUD placement  Reviewed with patient that [facility] immediately, but that they will not necessarily be reviewed by me immediately  Explained that I will review results at my earliest opportunity and contact patient appropriately

## 2023-06-09 ENCOUNTER — HOSPITAL ENCOUNTER (OUTPATIENT)
Dept: MAMMOGRAPHY | Facility: CLINIC | Age: 43
Discharge: HOME | End: 2023-06-09
Payer: COMMERCIAL

## 2023-06-09 ENCOUNTER — HOSPITAL ENCOUNTER (OUTPATIENT)
Dept: ULTRASOUND IMAGING | Facility: CLINIC | Age: 43
Discharge: HOME | End: 2023-06-09
Payer: COMMERCIAL

## 2023-06-09 VITALS — BODY MASS INDEX: 25.52 KG/M2 | HEIGHT: 63 IN | WEIGHT: 144 LBS

## 2023-06-09 VITALS — HEIGHT: 63 IN | WEIGHT: 144 LBS | BODY MASS INDEX: 25.52 KG/M2

## 2023-06-09 DIAGNOSIS — Z12.31 ENCOUNTER FOR SCREENING MAMMOGRAM FOR MALIGNANT NEOPLASM OF BREAST: ICD-10-CM

## 2023-06-09 DIAGNOSIS — R92.8 ABNORMAL MAMMOGRAM: ICD-10-CM

## 2023-06-09 DIAGNOSIS — R92.8 ABNORMAL ULTRASOUND OF BREAST: ICD-10-CM

## 2023-06-09 DIAGNOSIS — Z01.419 WOMEN'S ANNUAL ROUTINE GYNECOLOGICAL EXAMINATION: ICD-10-CM

## 2023-06-09 PROCEDURE — 77066 DX MAMMO INCL CAD BI: CPT

## 2023-06-09 PROCEDURE — G0279 TOMOSYNTHESIS, MAMMO: HCPCS

## 2023-06-09 PROCEDURE — 76642 ULTRASOUND BREAST LIMITED: CPT

## 2023-06-10 DIAGNOSIS — Z12.31 ENCOUNTER FOR SCREENING MAMMOGRAM FOR MALIGNANT NEOPLASM OF BREAST: ICD-10-CM

## 2023-06-10 DIAGNOSIS — R92.8 ABNORMAL MAMMOGRAM OF LEFT BREAST: Primary | ICD-10-CM

## 2023-06-16 ENCOUNTER — TELEPHONE (OUTPATIENT)
Dept: OBGYN CLINIC | Facility: CLINIC | Age: 43
End: 2023-06-16

## 2023-06-16 NOTE — TELEPHONE ENCOUNTER
----- Message from Coralie Meckel sent at 6/15/2023  5:28 PM EDT -----  Dayton Jameson from [facility] called indicating that the pt has another insurance that is primary to [facility] was denied   You can reach her at [phone] if you have further questions

## 2023-06-16 NOTE — TELEPHONE ENCOUNTER
Per [facility] Mona, patient has [employer] listed as primary insurance  Patient needs to call the [facility] office to correct it if she does not have the [employer] any more  Called and informed patient, patient stated she hasn't been covered by [employer] since August of 2022, and that she will call them to correct it

## 2023-06-26 ENCOUNTER — TELEPHONE (OUTPATIENT)
Dept: OBGYN CLINIC | Facility: CLINIC | Age: 43
End: 2023-06-26

## 2023-06-26 NOTE — TELEPHONE ENCOUNTER
lvm for patient to advise we have received Paragard device in office  Given office call back number to help schedule insertion visit

## 2023-07-17 ENCOUNTER — PROCEDURE VISIT (OUTPATIENT)
Dept: OBGYN CLINIC | Facility: CLINIC | Age: 43
End: 2023-07-17

## 2023-07-17 VITALS
WEIGHT: 149 LBS | BODY MASS INDEX: 26.4 KG/M2 | HEIGHT: 63 IN | DIASTOLIC BLOOD PRESSURE: 60 MMHG | HEART RATE: 72 BPM | SYSTOLIC BLOOD PRESSURE: 111 MMHG

## 2023-07-17 DIAGNOSIS — R68.82 LOW LIBIDO: ICD-10-CM

## 2023-07-17 DIAGNOSIS — Z30.430 ENCOUNTER FOR IUD INSERTION: Primary | ICD-10-CM

## 2023-07-17 LAB — SL AMB POCT URINE HCG: NEGATIVE

## 2023-07-17 PROCEDURE — 81025 URINE PREGNANCY TEST: CPT | Performed by: NURSE PRACTITIONER

## 2023-07-17 PROCEDURE — 58300 INSERT INTRAUTERINE DEVICE: CPT | Performed by: NURSE PRACTITIONER

## 2023-07-17 RX ORDER — COPPER 313.4 MG/1
1 INTRAUTERINE DEVICE INTRAUTERINE ONCE
Status: COMPLETED | OUTPATIENT
Start: 2023-07-17 | End: 2023-07-17

## 2023-07-17 RX ADMIN — COPPER 1 INTRA UTERINE DEVICE: 313.4 INTRAUTERINE DEVICE INTRAUTERINE at 17:00

## 2023-07-17 NOTE — PROGRESS NOTES
Iud insertions    Date/Time: 7/17/2023 4:45 PM    Performed by: DERIAN Valdivia  Authorized by: DERIAN Valdivia    Other Assisting Provider: No    Verbal consent obtained?: Yes    Written consent obtained?: Yes    Risks and benefits: Risks, benefits and alternatives were discussed    Consent given by:  Patient  Time Out:     Time out: Immediately prior to the procedure a time out was called    Patient states understanding of procedure being performed: Yes    Patient's understanding of procedure matches consent: Yes    Procedure consent matches procedure scheduled: Yes    Relevant documents present and verified: Yes    Test results available and properly labeled: Yes    Patient identity confirmed:  Verbally with patient  Procedure:     Pelvic exam performed: yes      Negative urine pregnancy test: yes      Cervix cleaned and prepped: yes      Speculum placed in vagina: yes      Tenaculum applied to cervix: yes      Uterus sounded: yes      Uterus sound depth (cm):  8    IUD inserted with no complications: yes      IUD type:  ParaGard    Strings trimmed: yes    Post-procedure:     Patient tolerated procedure well: yes      Patient will follow up after next period: yes

## 2023-08-17 ENCOUNTER — OFFICE VISIT (OUTPATIENT)
Dept: OBGYN CLINIC | Facility: CLINIC | Age: 43
End: 2023-08-17

## 2023-08-17 VITALS
WEIGHT: 135.8 LBS | BODY MASS INDEX: 24.06 KG/M2 | DIASTOLIC BLOOD PRESSURE: 64 MMHG | RESPIRATION RATE: 18 BRPM | HEART RATE: 81 BPM | HEIGHT: 63 IN | SYSTOLIC BLOOD PRESSURE: 97 MMHG

## 2023-08-17 DIAGNOSIS — N92.1 BREAKTHROUGH BLEEDING WITH IUD: ICD-10-CM

## 2023-08-17 DIAGNOSIS — Z30.431 IUD CHECK UP: Primary | ICD-10-CM

## 2023-08-17 DIAGNOSIS — Z97.5 BREAKTHROUGH BLEEDING WITH IUD: ICD-10-CM

## 2023-08-17 PROCEDURE — 99213 OFFICE O/P EST LOW 20 MIN: CPT | Performed by: NURSE PRACTITIONER

## 2023-08-17 RX ORDER — COPPER 313.4 MG/1
INTRAUTERINE DEVICE INTRAUTERINE
COMMUNITY
Start: 2023-06-22

## 2023-08-17 NOTE — PROGRESS NOTES
PROBLEM GYNECOLOGICAL VISIT    Kris Castillo is a 39 y.o. female who presents today for an IUD check. Her general medical history has been reviewed and she reports it as follows:    Past Medical History:   Diagnosis Date   • Asthma     Last Assessed    • BRCA1 negative    • BRCA2 negative    • Depression      Past Surgical History:   Procedure Laterality Date   • ABDOMINOPLASTY     • AUGMENTATION MAMMAPLASTY Bilateral     silicone   • OOPHORECTOMY N/A     Lateral     OB History        2    Para   1    Term   1            AB   1    Living   1       SAB   1    IAB        Ectopic        Multiple        Live Births   1               Social History     Tobacco Use   • Smoking status: Never   • Smokeless tobacco: Never   Vaping Use   • Vaping Use: Never used   Substance Use Topics   • Alcohol use: Yes     Comment: social   • Drug use: No     Social History     Substance and Sexual Activity   Sexual Activity Yes   • Partners: Male   • Birth control/protection: None       Current Outpatient Medications   Medication Instructions   • ascorbic acid (VITAMIN C) 500 mg, Oral, Daily   • CALCIUM CARBONATE-VITAMIN D PO 1 tablet, Oral   • ferrous sulfate 325 mg, Oral, Daily with breakfast   • folic acid (FOLVITE) 125 mcg, Daily   • intrauterine copper (PARAGARD) IUD No dose, route, or frequency recorded. • Sodium Fluoride 5000 PPM 1.1 % PSTE BRUSH IN EVENING FOR 2 MINTUES, EXPECTORATE, DO NOT RINSE. History of Present Illness:   Justin Thacker presents today for an IUD check. She has a Paragard IUD placed on 23. She reports that the first two weeks after placement she had period like bleeding. For the past two weeks she has has daily spotting. She denies any pain. She has had intercourse since placement without pain and the string is not bothersome. Review of Systems:  Review of Systems   Constitutional: Negative. Gastrointestinal: Negative. Genitourinary: Negative. Physical Exam:  BP 97/64 (BP Location: Right arm, Patient Position: Sitting, Cuff Size: Adult)   Pulse 81   Resp 18   Ht 5' 3" (1.6 m)   Wt 61.6 kg (135 lb 12.8 oz)   LMP  (LMP Unknown)   BMI 24.06 kg/m²   Physical Exam  Constitutional:       General: She is not in acute distress. Appearance: Normal appearance. Genitourinary:      Vulva normal.      No lesions in the vagina. No vaginal discharge. No cervical lesion. IUD strings visualized. Neurological:      Mental Status: She is alert. Skin:     General: Skin is warm and dry. Psychiatric:         Mood and Affect: Mood normal.         Behavior: Behavior normal.   Vitals reviewed. Assessment:   1. IUD check    2. Breakthrough bleeding on IUD     Plan:   1. IUD is in place. String of expected length. 2. Reviewed heavier menses and irregular spotting is possible with new IUD, typically this stabilizes within 3-6 months. 3. We discussed short course of NSAIDs over the next 3-5 days may help lessen bleeding. 4. She is certain she is done with childbearing. She is considering proceeding with permanent sterilization if the bleeding pattern does not improve. MA31 completed with patient today. 5. She will return in one month for an IUD recheck and to reassess bleeding pattern. If bleeding is not improved she would like to proceed with the sterilization. Reviewed with patient that [facility] immediately, but that they will not necessarily be reviewed by me immediately. Explained that I will review results at my earliest opportunity and contact patient appropriately.

## 2023-08-24 ENCOUNTER — APPOINTMENT (OUTPATIENT)
Dept: LAB | Facility: CLINIC | Age: 43
End: 2023-08-24
Payer: COMMERCIAL

## 2023-08-24 DIAGNOSIS — Z11.3 SCREEN FOR STD (SEXUALLY TRANSMITTED DISEASE): ICD-10-CM

## 2023-08-24 DIAGNOSIS — R68.82 LOW LIBIDO: ICD-10-CM

## 2023-08-24 LAB
HBV SURFACE AG SER QL: NORMAL
HCV AB SER QL: NORMAL
TSH SERPL DL<=0.05 MIU/L-ACNC: 1.13 UIU/ML (ref 0.45–4.5)

## 2023-08-24 PROCEDURE — 36415 COLL VENOUS BLD VENIPUNCTURE: CPT

## 2023-08-24 PROCEDURE — 87340 HEPATITIS B SURFACE AG IA: CPT

## 2023-08-24 PROCEDURE — 86803 HEPATITIS C AB TEST: CPT

## 2023-08-24 PROCEDURE — 84443 ASSAY THYROID STIM HORMONE: CPT

## 2023-08-25 ENCOUNTER — TELEPHONE (OUTPATIENT)
Dept: OBGYN CLINIC | Facility: CLINIC | Age: 43
End: 2023-08-25

## 2023-08-25 NOTE — TELEPHONE ENCOUNTER
----- Message from Leela Machado, [address] [place] [address] sent at 8/25/2023  7:14 AM EDT -----  Please let her know the thyroid testing and hepatitis screenings are normal. Thank you!

## 2023-08-25 NOTE — TELEPHONE ENCOUNTER
Patient informed of normal thyroid testing and hepatitis screening, patient verbalized understanding.

## 2023-09-18 ENCOUNTER — OFFICE VISIT (OUTPATIENT)
Dept: OBGYN CLINIC | Facility: CLINIC | Age: 43
End: 2023-09-18

## 2023-09-18 VITALS
SYSTOLIC BLOOD PRESSURE: 110 MMHG | WEIGHT: 141 LBS | BODY MASS INDEX: 24.98 KG/M2 | HEIGHT: 63 IN | HEART RATE: 83 BPM | DIASTOLIC BLOOD PRESSURE: 77 MMHG

## 2023-09-18 DIAGNOSIS — Z30.432 ENCOUNTER FOR IUD REMOVAL: Primary | ICD-10-CM

## 2023-09-18 PROCEDURE — 58301 REMOVE INTRAUTERINE DEVICE: CPT | Performed by: NURSE PRACTITIONER

## 2023-09-18 NOTE — PROGRESS NOTES
Iud removal    Date/Time: 9/18/2023 10:45 AM    Performed by: DERIAN Wellington  Authorized by: DERIAN Wellington  Universal Protocol:  Consent: Verbal consent obtained. Risks and benefits: risks, benefits and alternatives were discussed  Consent given by: patient  Time out: Immediately prior to procedure a "time out" was called to verify the correct patient, procedure, equipment, support staff and site/side marked as required. Patient understanding: patient states understanding of the procedure being performed  Patient consent: the patient's understanding of the procedure matches consent given  Relevant documents: relevant documents present and verified  Patient identity confirmed: verbally with patient      Procedure:     Removed with no complications: yes      Removal due to mechanical complications of IUD: no      Removal due to infection and inflammatory reaction: no    Comments:      Kerry Boogie has continued to have heavy and prolonged bleeding since insertion of Paragard. She tried a course of NSAIDs to decrease the bleeding which was not effective. She desires removal at this time. She had previously wanted to pursue permanent sterilization if the bleeding pattern did not improve with this Paragard. She reports that since that time she has ended the relationship with her partner and she is no longer sexually active at this time. She plans to return to the office to pursue sterilization in the future.  Return for annual exam.

## 2023-11-22 ENCOUNTER — OFFICE VISIT (OUTPATIENT)
Dept: URGENT CARE | Age: 43
End: 2023-11-22
Payer: COMMERCIAL

## 2023-11-22 VITALS
RESPIRATION RATE: 18 BRPM | OXYGEN SATURATION: 100 % | HEART RATE: 78 BPM | SYSTOLIC BLOOD PRESSURE: 115 MMHG | DIASTOLIC BLOOD PRESSURE: 80 MMHG | TEMPERATURE: 97.7 F

## 2023-11-22 DIAGNOSIS — R21 RASH: Primary | ICD-10-CM

## 2023-11-22 PROCEDURE — 99213 OFFICE O/P EST LOW 20 MIN: CPT

## 2023-11-22 RX ORDER — MOMETASONE FUROATE 1 MG/G
OINTMENT TOPICAL
COMMUNITY
Start: 2023-11-09

## 2023-11-22 RX ORDER — METHYLPREDNISOLONE 4 MG/1
TABLET ORAL
Qty: 1 EACH | Refills: 0 | Status: SHIPPED | OUTPATIENT
Start: 2023-11-22

## 2023-11-22 RX ORDER — CLINDAMYCIN AND BENZOYL PEROXIDE 10; 50 MG/G; MG/G
GEL TOPICAL
COMMUNITY
Start: 2023-11-08

## 2023-11-22 NOTE — PROGRESS NOTES
[facility]        NAME: Ramandepe Bailey is a 39 y.o. female  : 1980    MRN: [id number]  DATE: 2023  TIME: 6:00 PM    Assessment and Plan   Rash [R21]  1. Rash  methylPREDNISolone 4 MG tablet therapy pack            Patient Instructions       Follow up with PCP in 3-5 days. Proceed to  ER if symptoms worsen. Chief Complaint     Chief Complaint   Patient presents with    Eye Pain     Started on Thursday , redness under eye is increasing . Itching         History of Present Illness       38 y/o F presents for R sided periocular rash x 2 weeks. Pt denies having any skin conditions. No new soaps, detergents, laundry sheets, beauty products, foods, medications. Denies working outside. Non painful. Pruritic. Hydrocortisone cream PRN. Eye Pain   Associated symptoms include itching. Pertinent negatives include no eye discharge or fever. Review of Systems   Review of Systems   Constitutional:  Negative for chills and fever. Eyes:  Positive for itching. Negative for pain, discharge and visual disturbance. Current Medications       Current Outpatient Medications:     ascorbic acid (VITAMIN C) 500 MG tablet, Take 500 mg by mouth daily, Disp: , Rfl:     CALCIUM CARBONATE-VITAMIN D PO, Take 1 tablet by mouth, Disp: , Rfl:     clindamycin-benzoyl peroxide (BENZACLIN) gel, apply twice a day to AFFECTED ACNE AREAS ON THE BODY, Disp: , Rfl:     methylPREDNISolone 4 MG tablet therapy pack, Use as directed on package, Disp: 1 each, Rfl: 0    mometasone (ELOCON) 0.1 % ointment, APPLY A THIN LAYER TWICE A DAY FOR 2 WEEKS TO DISCOLORATION PATCH. ..  (REFER TO PRESCRIPTION NOTES). , Disp: , Rfl:     Sodium Fluoride 5000 PPM 1.1 % PSTE, , Disp: , Rfl:     ferrous sulfate 325 (65 Fe) mg tablet, Take 325 mg by mouth daily with breakfast (Patient not taking: Reported on 2023), Disp: , Rfl:     folic acid (FOLVITE) 520 mcg tablet, Take 400 mcg by mouth daily (Patient not taking: Reported on 7/17/2023), Disp: , Rfl:     intrauterine copper (PARAGARD) IUD, , Disp: , Rfl:   No current facility-administered medications for this visit. Facility-Administered Medications Ordered in Other Visits:     lidocaine-epinephrine (XYLOCAINE/EPINEPHRINE) 1 %-1:100,000 20 mL, sodium bicarbonate 2 mEq infiltration, , Infiltration, Once, Syed Ryan MD    Current Allergies     Allergies as of 11/22/2023    (No Known Allergies)            The following portions of the patient's history were reviewed and updated as appropriate: allergies, current medications, past family history, past medical history, past social history, past surgical history and problem list.     Past Medical History:   Diagnosis Date    Asthma     Last Assessed [id number]    BRCA1 negative     BRCA2 negative     Depression        Past Surgical History:   Procedure Laterality Date    ABDOMINOPLASTY      AUGMENTATION MAMMAPLASTY Bilateral 94/27/4021    silicone    OOPHORECTOMY N/A     Lateral       Family History   Problem Relation Age of Onset    Depression Mother     Alcohol abuse Father     Kidney disease Father     No Known Problems Sister     No Known Problems Sister     Cataracts Maternal Grandmother     Hyperlipidemia Maternal Grandmother     Breast cancer Maternal Grandmother 67    Diabetes Maternal Grandfather     No Known Problems Paternal Grandmother     No Known Problems Paternal Grandfather     No Known Problems Son     No Known Problems Maternal Aunt     No Known Problems Maternal Aunt     Cancer Maternal Uncle 79        liver    No Known Problems Paternal Aunt     No Known Problems Paternal Aunt     No Known Problems Paternal Aunt     No Known Problems Paternal Aunt          Medications have been verified. Objective   /80   Pulse 78   Temp 97.7 °F (36.5 °C) (Temporal)   Resp 18   SpO2 100%   No LMP recorded. (Menstrual status: Birth Control).        Physical Exam     Physical Exam  Vitals and nursing note reviewed. Constitutional:       General: She is not in acute distress. Appearance: She is not toxic-appearing. HENT:      Head: Normocephalic and atraumatic. Mouth/Throat:      Mouth: Mucous membranes are moist.   Eyes:      General:         Right eye: No discharge. Left eye: No discharge. Conjunctiva/sclera: Conjunctivae normal.      Pupils: Pupils are equal, round, and reactive to light. Comments: R sided periocular rash at 6 to 9 o clock position  No TTP   No discharge or warmth  Erythematous. Non raised    Pulmonary:      Effort: Pulmonary effort is normal.      Breath sounds: Normal breath sounds. Neurological:      Mental Status: She is alert.

## 2023-12-22 ENCOUNTER — OFFICE VISIT (OUTPATIENT)
Dept: FAMILY MEDICINE CLINIC | Facility: CLINIC | Age: 43
End: 2023-12-22
Payer: COMMERCIAL

## 2023-12-22 VITALS
HEART RATE: 82 BPM | BODY MASS INDEX: 25.3 KG/M2 | WEIGHT: 142.8 LBS | RESPIRATION RATE: 16 BRPM | DIASTOLIC BLOOD PRESSURE: 80 MMHG | HEIGHT: 63 IN | OXYGEN SATURATION: 99 % | SYSTOLIC BLOOD PRESSURE: 114 MMHG | TEMPERATURE: 98.4 F

## 2023-12-22 DIAGNOSIS — R07.89 ATYPICAL CHEST PAIN: Primary | ICD-10-CM

## 2023-12-22 DIAGNOSIS — R10.13 DYSPEPSIA: ICD-10-CM

## 2023-12-22 PROCEDURE — 99213 OFFICE O/P EST LOW 20 MIN: CPT | Performed by: FAMILY MEDICINE

## 2023-12-22 RX ORDER — PANTOPRAZOLE SODIUM 40 MG/1
40 TABLET, DELAYED RELEASE ORAL DAILY
Qty: 30 TABLET | Refills: 1 | Status: SHIPPED | OUTPATIENT
Start: 2023-12-22

## 2023-12-22 NOTE — PROGRESS NOTES
Name: Silva Rodriguez      : 1980      MRN: [id number]  Encounter Provider: Erick Reddy MD  Encounter Date: 2023   Encounter department: [facility]    Assessment & Plan     Intermittent chest spasms and abdominal pain x 4 months. No exacerbating or alleviating factors. Non exertional and occurs a few times a week. Possible esophageal spasms or GERD. Will trial PPI for 4 weeks.  Asked to call if sx worsen.     1. Atypical chest pain  -     pantoprazole (PROTONIX) 40 mg tablet; Take 1 tablet (40 mg total) by mouth daily    2. Dyspepsia  -     pantoprazole (PROTONIX) 40 mg tablet; Take 1 tablet (40 mg total) by mouth daily           Subjective      Presents with intermittent chest pain and abdominal pain x 4 months.  Pain is sharp and last several seconds.  Comes on suddenly without any exacerbating factors.  Pain occurs 2-3 times a week and is nonexertional or occurs when eating  Denies nausea, water brash, belching, flatulence, numbness, or jaw pain.     Review of Systems   Cardiovascular:  Positive for chest pain. Negative for palpitations.   Gastrointestinal:  Positive for abdominal pain and diarrhea (chronic). Negative for abdominal distention.   Neurological:  Negative for light-headedness.       Current Outpatient Medications on File Prior to Visit   Medication Sig   • ascorbic acid (VITAMIN C) 500 MG tablet Take 500 mg by mouth daily   • CALCIUM CARBONATE-VITAMIN D PO Take 1 tablet by mouth   • clindamycin-benzoyl peroxide (BENZACLIN) gel apply twice a day to AFFECTED ACNE AREAS ON THE BODY   • mometasone (ELOCON) 0.1 % ointment APPLY A THIN LAYER TWICE A DAY FOR 2 WEEKS TO DISCOLORATION PATCH...  (REFER TO PRESCRIPTION NOTES).   • Sodium Fluoride 5000 PPM 1.1 % PSTE    • ferrous sulfate 325 (65 Fe) mg tablet Take 325 mg by mouth daily with breakfast (Patient not taking: Reported on 2023)   • folic acid (FOLVITE) 400 mcg tablet Take 400 mcg by mouth daily  "(Patient not taking: Reported on 7/17/2023)   • intrauterine copper (PARAGARD) IUD  (Patient not taking: Reported on 11/22/2023)   • methylPREDNISolone 4 MG tablet therapy pack Use as directed on package (Patient not taking: Reported on 12/22/2023)       Objective     /80 (BP Location: Left arm, Patient Position: Sitting, Cuff Size: Adult)   Pulse 82   Temp 98.4 °F (36.9 °C) (Tympanic)   Resp 16   Ht 5' 3\" (1.6 m)   Wt 64.8 kg (142 lb 12.8 oz)   SpO2 99%   BMI 25.30 kg/m²     Physical Exam  Constitutional:       General: She is not in acute distress.     Appearance: She is well-developed. She is not ill-appearing or toxic-appearing.   HENT:      Head: Normocephalic and atraumatic.   Cardiovascular:      Rate and Rhythm: Normal rate and regular rhythm.      Heart sounds: No murmur heard.  Pulmonary:      Effort: No respiratory distress.      Breath sounds: No stridor. No wheezing, rhonchi or rales.   Chest:      Chest wall: No tenderness.   Abdominal:      General: There is no distension.      Palpations: Abdomen is soft. There is no mass.      Tenderness: There is abdominal tenderness (epigastric). There is no guarding.      Hernia: No hernia is present.   Skin:     General: Skin is warm.   Neurological:      Mental Status: She is alert and oriented to person, place, and time.   Psychiatric:         Behavior: Behavior normal.       Erick Reddy MD    "

## 2024-01-14 ENCOUNTER — HOSPITAL ENCOUNTER (EMERGENCY)
Facility: HOSPITAL | Age: 44
Discharge: HOME | End: 2024-01-14
Attending: EMERGENCY MEDICINE
Payer: COMMERCIAL

## 2024-01-14 VITALS
RESPIRATION RATE: 18 BRPM | DIASTOLIC BLOOD PRESSURE: 60 MMHG | SYSTOLIC BLOOD PRESSURE: 127 MMHG | HEART RATE: 88 BPM | OXYGEN SATURATION: 98 % | TEMPERATURE: 97.3 F

## 2024-01-14 DIAGNOSIS — R11.2 NAUSEA AND VOMITING: Primary | ICD-10-CM

## 2024-01-14 PROCEDURE — 99282 EMERGENCY DEPT VISIT SF MDM: CPT

## 2024-01-14 RX ORDER — ACETAMINOPHEN 325 MG/1
650 TABLET ORAL ONCE
Status: DISCONTINUED | OUTPATIENT
Start: 2024-01-14 | End: 2024-01-14 | Stop reason: HOSPADM

## 2024-01-14 RX ORDER — ONDANSETRON 4 MG/1
4 TABLET, ORALLY DISINTEGRATING ORAL EVERY 6 HOURS PRN
Qty: 8 TABLET | Refills: 0 | Status: SHIPPED | OUTPATIENT
Start: 2024-01-14 | End: 2024-01-16

## 2024-01-14 RX ORDER — ONDANSETRON 4 MG/1
4 TABLET, ORALLY DISINTEGRATING ORAL ONCE
Status: COMPLETED | OUTPATIENT
Start: 2024-01-14 | End: 2024-01-14

## 2024-01-14 RX ORDER — IBUPROFEN 600 MG/1
600 TABLET ORAL ONCE
Status: COMPLETED | OUTPATIENT
Start: 2024-01-14 | End: 2024-01-14

## 2024-01-14 RX ADMIN — ONDANSETRON 4 MG: 4 TABLET, ORALLY DISINTEGRATING ORAL at 18:39

## 2024-01-14 RX ADMIN — IBUPROFEN 600 MG: 600 TABLET, FILM COATED ORAL at 18:39

## 2024-01-14 NOTE — ED PROVIDER NOTES
History  Chief Complaint   Patient presents with    Vomiting     C/o of vomiting all morning after drinking last night. States she had 4-5 mixed drinks that she can remember with vodka.      Patient is a 43-year-old female with past medical history of asthma and depression presenting for vomiting.  Patient states that she was out drinking last night and has been vomiting today every time she tries to eat or drink something.  She states that this has not happened before with drinking.  She otherwise complains of a headache but no other symptoms.  She denies any abdominal pain or diarrhea.  She does not have any known sick contacts.  She denies lightheadedness, dizziness, chest pain, shortness of breath, fever, chills, diaphoresis, abdominal pain, urinary symptoms, numbness, tingling, or weakness.        Prior to Admission Medications   Prescriptions Last Dose Informant Patient Reported? Taking?   CALCIUM CARBONATE-VITAMIN D PO   Yes No   Sig: Take 1 tablet by mouth   Sodium Fluoride 5000 PPM 1.1 % PSTE   Yes No   ascorbic acid (VITAMIN C) 500 MG tablet   Yes No   Sig: Take 500 mg by mouth daily   clindamycin-benzoyl peroxide (BENZACLIN) gel   Yes No   Sig: apply twice a day to AFFECTED ACNE AREAS ON THE BODY   ferrous sulfate 325 (65 Fe) mg tablet   Yes No   Sig: Take 325 mg by mouth daily with breakfast   Patient not taking: Reported on 11/22/2023   folic acid (FOLVITE) 400 mcg tablet   Yes No   Sig: Take 400 mcg by mouth daily   Patient not taking: Reported on 7/17/2023   intrauterine copper (PARAGARD) IUD   Yes No   Patient not taking: Reported on 11/22/2023   methylPREDNISolone 4 MG tablet therapy pack   No No   Sig: Use as directed on package   Patient not taking: Reported on 12/22/2023   mometasone (ELOCON) 0.1 % ointment   Yes No   Sig: APPLY A THIN LAYER TWICE A DAY FOR 2 WEEKS TO DISCOLORATION PATCH...  (REFER TO PRESCRIPTION NOTES).   pantoprazole (PROTONIX) 40 mg tablet   No No   Sig: Take 1 tablet (40 mg  total) by mouth daily      Facility-Administered Medications: None       Past Medical History:   Diagnosis Date    Asthma     Last Assessed 08Jun2012    BRCA1 negative     BRCA2 negative     Depression        Past Surgical History:   Procedure Laterality Date    ABDOMINOPLASTY      AUGMENTATION MAMMAPLASTY Bilateral 08/30/2022    silicone    OOPHORECTOMY N/A     Lateral       Family History   Problem Relation Age of Onset    Depression Mother     Alcohol abuse Father     Kidney disease Father     No Known Problems Sister     No Known Problems Sister     Cataracts Maternal Grandmother     Hyperlipidemia Maternal Grandmother     Breast cancer Maternal Grandmother 72    Diabetes Maternal Grandfather     No Known Problems Paternal Grandmother     No Known Problems Paternal Grandfather     No Known Problems Son     No Known Problems Maternal Aunt     No Known Problems Maternal Aunt     Cancer Maternal Uncle 67        liver    No Known Problems Paternal Aunt     No Known Problems Paternal Aunt     No Known Problems Paternal Aunt     No Known Problems Paternal Aunt      I have reviewed and agree with the history as documented.    E-Cigarette/Vaping    E-Cigarette Use Never User      E-Cigarette/Vaping Substances    Nicotine No     THC No     CBD No     Flavoring No     Other No     Unknown No      Social History     Tobacco Use    Smoking status: Never    Smokeless tobacco: Never   Vaping Use    Vaping status: Never Used   Substance Use Topics    Alcohol use: Yes     Comment: social    Drug use: No        Review of Systems    Physical Exam  ED Triage Vitals [01/14/24 1515]   Temperature Pulse Respirations Blood Pressure SpO2   (!) 97.3 °F (36.3 °C) 88 18 127/60 98 %      Temp Source Heart Rate Source Patient Position - Orthostatic VS BP Location FiO2 (%)   Temporal Monitor -- -- --      Pain Score       --             Orthostatic Vital Signs  Vitals:    01/14/24 1515   BP: 127/60   Pulse: 88       Physical Exam  Vitals and  nursing note reviewed.   Constitutional:       General: She is not in acute distress.     Appearance: Normal appearance. She is not ill-appearing, toxic-appearing or diaphoretic.   HENT:      Head: Normocephalic and atraumatic.      Mouth/Throat:      Mouth: Mucous membranes are moist.      Pharynx: Oropharynx is clear. No oropharyngeal exudate.   Eyes:      Extraocular Movements: Extraocular movements intact.   Cardiovascular:      Rate and Rhythm: Normal rate and regular rhythm.      Heart sounds: Normal heart sounds.   Pulmonary:      Effort: Pulmonary effort is normal. No respiratory distress.      Breath sounds: Normal breath sounds.   Abdominal:      General: Abdomen is flat. There is no distension.      Palpations: Abdomen is soft.      Tenderness: There is no abdominal tenderness.   Musculoskeletal:         General: Normal range of motion.      Cervical back: Normal range of motion and neck supple.   Skin:     General: Skin is warm and dry.      Capillary Refill: Capillary refill takes less than 2 seconds.   Neurological:      General: No focal deficit present.      Mental Status: She is alert and oriented to person, place, and time.      Cranial Nerves: No cranial nerve deficit.      Sensory: No sensory deficit.      Motor: No weakness.         ED Medications  Medications   acetaminophen (TYLENOL) tablet 650 mg (650 mg Oral Not Given 1/14/24 1840)   ondansetron (ZOFRAN-ODT) dispersible tablet 4 mg (4 mg Oral Given 1/14/24 1839)   ibuprofen (MOTRIN) tablet 600 mg (600 mg Oral Given 1/14/24 1839)       Diagnostic Studies  Results Reviewed       None                   No orders to display         Procedures  Procedures      ED Course                                       Medical Decision Making  Patient is a 43-year-old female presenting with nausea and vomiting.    Differential includes hangover versus viral gastritis versus food poisoning.  Patient was overall well-appearing, so she was given Motrin and  Zofran and a p.o. challenge.  Patient felt significantly better and was able to tolerate a significant amount of water.    Patient was cleared for discharge with PCP follow-up, return precautions, and Zofran prescription.    Risk  OTC drugs.  Prescription drug management.          Disposition  Final diagnoses:   Nausea and vomiting     Time reflects when diagnosis was documented in both MDM as applicable and the Disposition within this note       Time User Action Codes Description Comment    1/14/2024  7:03 PM Se Carmona Add [R11.2] Nausea and vomiting           ED Disposition       ED Disposition   Discharge    Condition   Stable    Date/Time   Sun Jan 14, 2024  7:03 PM    Comment   Silva Rodriguez discharge to home/self care.                   Follow-up Information       Follow up With Specialties Details Why Contact Info Additional Information    Erick Reddy MD Family Medicine   [address] [place] [id number] [phone]       [facility] Emergency Department Emergency Medicine   [address] [address]  [phone] [facility] Emergency Department, [address] [place], [place], [address]   [phone]            Discharge Medication List as of 1/14/2024  7:05 PM        START taking these medications    Details   ondansetron (ZOFRAN-ODT) 4 mg disintegrating tablet Take 1 tablet (4 mg total) by mouth every 6 (six) hours as needed for nausea or vomiting for up to 2 days, Starting Sun 1/14/2024, Until Tue 1/16/2024 at 2359, Normal           CONTINUE these medications which have NOT CHANGED    Details   ascorbic acid (VITAMIN C) 500 MG tablet Take 500 mg by mouth daily, Historical Med      CALCIUM CARBONATE-VITAMIN D PO Take 1 tablet by mouth, Historical Med      clindamycin-benzoyl peroxide (BENZACLIN) gel apply twice a day to AFFECTED ACNE AREAS ON THE BODY, Historical Med      ferrous sulfate 325  (65 Fe) mg tablet Take 325 mg by mouth daily with breakfast, Historical Med      folic acid (FOLVITE) 400 mcg tablet Take 400 mcg by mouth daily, Historical Med      intrauterine copper (PARAGARD) IUD Starting Thu 6/22/2023, Historical Med      methylPREDNISolone 4 MG tablet therapy pack Use as directed on package, Normal      mometasone (ELOCON) 0.1 % ointment APPLY A THIN LAYER TWICE A DAY FOR 2 WEEKS TO DISCOLORATION PATCH...  (REFER TO PRESCRIPTION NOTES)., Historical Med      pantoprazole (PROTONIX) 40 mg tablet Take 1 tablet (40 mg total) by mouth daily, Starting Fri 12/22/2023, Normal      Sodium Fluoride 5000 PPM 1.1 % PSTE Historical Med           No discharge procedures on file.    PDMP Review         Value Time User    PDMP Reviewed  Yes 12/30/2022  3:31 PM DERIAN Camp             ED Provider  Attending physically available and evaluated Silva Rodriguez. I managed the patient along with the ED Attending.    Electronically Signed by           Se Carmona MD  01/14/24 2013

## 2024-01-15 NOTE — DISCHARGE INSTRUCTIONS
Please follow-up with primary care provider.  Please return to the ED with new or worsening symptoms-see attached.  A prescription was sent to the pharmacy for you.

## 2024-02-19 ENCOUNTER — TELEPHONE (OUTPATIENT)
Age: 44
End: 2024-02-19

## 2024-02-19 DIAGNOSIS — R92.8 ABNORMAL MAMMOGRAM OF LEFT BREAST: Primary | ICD-10-CM

## 2024-02-19 NOTE — TELEPHONE ENCOUNTER
Patient called because she needs an order for a breast sonogram because of a lump in her breast. She would like a call back once the order is in so she can schedule the appointment.

## 2024-03-04 ENCOUNTER — OFFICE VISIT (OUTPATIENT)
Dept: FAMILY MEDICINE CLINIC | Facility: CLINIC | Age: 44
End: 2024-03-04
Payer: COMMERCIAL

## 2024-03-04 VITALS
OXYGEN SATURATION: 99 % | TEMPERATURE: 98.6 F | SYSTOLIC BLOOD PRESSURE: 104 MMHG | HEIGHT: 63 IN | WEIGHT: 143.4 LBS | RESPIRATION RATE: 16 BRPM | BODY MASS INDEX: 25.41 KG/M2 | HEART RATE: 73 BPM | DIASTOLIC BLOOD PRESSURE: 60 MMHG

## 2024-03-04 DIAGNOSIS — R51.9 NEW ONSET OF HEADACHES: ICD-10-CM

## 2024-03-04 DIAGNOSIS — E55.9 VITAMIN D DEFICIENCY: Primary | ICD-10-CM

## 2024-03-04 DIAGNOSIS — N95.1 PERIMENOPAUSAL VASOMOTOR SYMPTOMS: ICD-10-CM

## 2024-03-04 DIAGNOSIS — R53.83 FATIGUE, UNSPECIFIED TYPE: ICD-10-CM

## 2024-03-04 PROCEDURE — 99214 OFFICE O/P EST MOD 30 MIN: CPT | Performed by: FAMILY MEDICINE

## 2024-03-04 RX ORDER — RIZATRIPTAN BENZOATE 5 MG/1
5 TABLET ORAL ONCE AS NEEDED
Qty: 10 TABLET | Refills: 0 | Status: SHIPPED | OUTPATIENT
Start: 2024-03-04

## 2024-03-04 RX ORDER — PIMECROLIMUS 10 MG/G
CREAM TOPICAL
COMMUNITY
Start: 2024-02-23

## 2024-03-04 NOTE — PROGRESS NOTES
Name: Silva Rodriguez      : 1980      MRN: [id number]  Encounter Provider: Erick Reddy MD  Encounter Date: 3/4/2024   Encounter [facility]: [facility]    Assessment & Plan     1. Vitamin D deficiency  Comments:  Recheck especially with recent fatigue  Orders:  -     Vitamin D 25 hydroxy; Future    2. Fatigue, unspecified type  Comments:  Chronic. Labs orderd  Orders:  -     Vitamin D 25 hydroxy; Future  -     CBC and differential; Future  -     Iron Panel (Includes Ferritin, Iron Sat%, Iron, and TIBC); Future  -     CBC and differential; Future    3. New onset of headaches  Comments:  unilateral headaches with aura. Examination unremarkable. Scalp tenderness reported during headaches and last hours. Possible nummular headaches vs migraines. Mg and riboflavin. Trial Maxalt and if not effective, NSAIDS  Orders:  -     rizatriptan (MAXALT) 5 mg tablet; Take 1 tablet (5 mg total) by mouth once as needed for migraine for up to 1 dose may repeat in 2 hours if necessary  -     Comprehensive metabolic panel; Future    4. Perimenopausal vasomotor symptoms  Comments:  Prefer non hormonal/ non SSRI approach. May try phytoestrogens, black cohosh, licorice root, or red clover.           Subjective      Complains of left parietal headaches. Started recently. Pain last hours ( 12+) and localized to the left side. Often associated with aura. No nausea, vomiting, photophobia, or phonophobia. No history of migraines or head injury. Also complains of night sweats, hot flashes, and fatigue. Periods have gotten lighter ut hasn't missed any    Review of Systems    Current Outpatient Medications on File Prior to Visit   Medication Sig   • ascorbic acid (VITAMIN C) 500 MG tablet Take 500 mg by mouth daily   • CALCIUM CARBONATE-VITAMIN D PO Take 1 tablet by mouth   • clindamycin-benzoyl peroxide (BENZACLIN) gel apply twice a day to AFFECTED ACNE AREAS ON THE BODY   • mometasone (ELOCON) 0.1 %  "ointment APPLY A THIN LAYER TWICE A DAY FOR 2 WEEKS TO DISCOLORATION PATCH...  (REFER TO PRESCRIPTION NOTES).   • pantoprazole (PROTONIX) 40 mg tablet Take 1 tablet (40 mg total) by mouth daily   • pimecrolimus (ELIDEL) 1 % cream APPLY A THIN LAYER TWICE DAILY FOR 8 WEEKS TO DISCOLORED PATCHES ON ARMS AND RED BUMMPPY LIZONES ON FACE AS DIRECTED   • ferrous sulfate 325 (65 Fe) mg tablet Take 325 mg by mouth daily with breakfast (Patient not taking: Reported on 11/22/2023)   • folic acid (FOLVITE) 400 mcg tablet Take 400 mcg by mouth daily (Patient not taking: Reported on 7/17/2023)   • intrauterine copper (PARAGARD) IUD  (Patient not taking: Reported on 11/22/2023)   • methylPREDNISolone 4 MG tablet therapy pack Use as directed on package (Patient not taking: Reported on 12/22/2023)   • ondansetron (ZOFRAN-ODT) 4 mg disintegrating tablet Take 1 tablet (4 mg total) by mouth every 6 (six) hours as needed for nausea or vomiting for up to 2 days (Patient not taking: Reported on 3/4/2024)   • Sodium Fluoride 5000 PPM 1.1 % PSTE  (Patient not taking: Reported on 3/4/2024)       Objective     /60 (BP Location: Left arm, Patient Position: Sitting, Cuff Size: Adult)   Pulse 73   Temp 98.6 °F (37 °C) (Tympanic)   Resp 16   Ht 5' 3\" (1.6 m)   Wt 65 kg (143 lb 6.4 oz)   SpO2 99%   BMI 25.40 kg/m²     Physical Exam  Constitutional:       General: She is not in acute distress.     Appearance: Normal appearance. She is not ill-appearing or toxic-appearing.   HENT:      Head: Normocephalic and atraumatic.   Eyes:      General: No visual field deficit.  Cardiovascular:      Rate and Rhythm: Normal rate and regular rhythm.      Heart sounds: No murmur heard.  Pulmonary:      Effort: Pulmonary effort is normal. No respiratory distress.      Breath sounds: No stridor. No wheezing or rhonchi.   Skin:     General: Skin is warm.      Findings: No rash.      Comments: No scalp tenderness or rash    Neurological:      General: " No focal deficit present.      Mental Status: She is alert and oriented to person, place, and time.      Cranial Nerves: No cranial nerve deficit or facial asymmetry.      Sensory: No sensory deficit.      Motor: No weakness or pronator drift.      Coordination: Finger-Nose-Finger Test normal.      Gait: Gait normal.   Psychiatric:         Behavior: Behavior normal.       Erick Reddy MD

## 2024-03-06 ENCOUNTER — HOSPITAL ENCOUNTER (OUTPATIENT)
Dept: ULTRASOUND IMAGING | Facility: CLINIC | Age: 44
Discharge: HOME | End: 2024-03-06
Payer: COMMERCIAL

## 2024-03-06 VITALS — HEIGHT: 63 IN | BODY MASS INDEX: 25.34 KG/M2 | WEIGHT: 143 LBS

## 2024-03-06 DIAGNOSIS — R92.8 ABNORMAL MAMMOGRAM OF LEFT BREAST: ICD-10-CM

## 2024-03-06 PROCEDURE — 76642 ULTRASOUND BREAST LIMITED: CPT

## 2024-03-07 ENCOUNTER — APPOINTMENT (OUTPATIENT)
Dept: LAB | Facility: CLINIC | Age: 44
End: 2024-03-07
Payer: COMMERCIAL

## 2024-03-07 ENCOUNTER — TELEPHONE (OUTPATIENT)
Age: 44
End: 2024-03-07

## 2024-03-07 DIAGNOSIS — R51.9 NEW ONSET OF HEADACHES: ICD-10-CM

## 2024-03-07 DIAGNOSIS — E55.9 VITAMIN D DEFICIENCY: ICD-10-CM

## 2024-03-07 DIAGNOSIS — R53.83 FATIGUE, UNSPECIFIED TYPE: ICD-10-CM

## 2024-03-07 LAB
25(OH)D3 SERPL-MCNC: 66.8 NG/ML (ref 30–100)
ALBUMIN SERPL BCP-MCNC: 4.6 G/DL (ref 3.5–5)
ALP SERPL-CCNC: 60 U/L (ref 34–104)
ALT SERPL W P-5'-P-CCNC: 12 U/L (ref 7–52)
ANION GAP SERPL CALCULATED.3IONS-SCNC: 7 MMOL/L
AST SERPL W P-5'-P-CCNC: 13 U/L (ref 13–39)
BASOPHILS # BLD AUTO: 0.06 THOUSANDS/ÂΜL (ref 0–0.1)
BASOPHILS NFR BLD AUTO: 1 % (ref 0–1)
BILIRUB SERPL-MCNC: 0.39 MG/DL (ref 0.2–1)
BUN SERPL-MCNC: 18 MG/DL (ref 5–25)
CALCIUM SERPL-MCNC: 9.6 MG/DL (ref 8.4–10.2)
CHLORIDE SERPL-SCNC: 102 MMOL/L (ref 96–108)
CO2 SERPL-SCNC: 26 MMOL/L (ref 21–32)
CREAT SERPL-MCNC: 0.72 MG/DL (ref 0.6–1.3)
EOSINOPHIL # BLD AUTO: 0.41 THOUSAND/ÂΜL (ref 0–0.61)
EOSINOPHIL NFR BLD AUTO: 7 % (ref 0–6)
ERYTHROCYTE [DISTWIDTH] IN BLOOD BY AUTOMATED COUNT: 12.5 % (ref 11.6–15.1)
FERRITIN SERPL-MCNC: 20 NG/ML (ref 11–307)
GFR SERPL CREATININE-BSD FRML MDRD: 102 ML/MIN/1.73SQ M
GLUCOSE P FAST SERPL-MCNC: 84 MG/DL (ref 65–99)
HCT VFR BLD AUTO: 40.1 % (ref 34.8–46.1)
HGB BLD-MCNC: 13.1 G/DL (ref 11.5–15.4)
IMM GRANULOCYTES # BLD AUTO: 0.01 THOUSAND/UL (ref 0–0.2)
IMM GRANULOCYTES NFR BLD AUTO: 0 % (ref 0–2)
IRON SATN MFR SERPL: 18 % (ref 15–50)
IRON SERPL-MCNC: 72 UG/DL (ref 50–212)
LYMPHOCYTES # BLD AUTO: 1.76 THOUSANDS/ÂΜL (ref 0.6–4.47)
LYMPHOCYTES NFR BLD AUTO: 32 % (ref 14–44)
MCH RBC QN AUTO: 29.2 PG (ref 26.8–34.3)
MCHC RBC AUTO-ENTMCNC: 32.7 G/DL (ref 31.4–37.4)
MCV RBC AUTO: 89 FL (ref 82–98)
MONOCYTES # BLD AUTO: 0.51 THOUSAND/ÂΜL (ref 0.17–1.22)
MONOCYTES NFR BLD AUTO: 9 % (ref 4–12)
NEUTROPHILS # BLD AUTO: 2.81 THOUSANDS/ÂΜL (ref 1.85–7.62)
NEUTS SEG NFR BLD AUTO: 51 % (ref 43–75)
NRBC BLD AUTO-RTO: 0 /100 WBCS
PLATELET # BLD AUTO: 326 THOUSANDS/UL (ref 149–390)
PMV BLD AUTO: 10 FL (ref 8.9–12.7)
POTASSIUM SERPL-SCNC: 4.1 MMOL/L (ref 3.5–5.3)
PROT SERPL-MCNC: 8 G/DL (ref 6.4–8.4)
RBC # BLD AUTO: 4.49 MILLION/UL (ref 3.81–5.12)
SODIUM SERPL-SCNC: 135 MMOL/L (ref 135–147)
TIBC SERPL-MCNC: 392 UG/DL (ref 250–450)
UIBC SERPL-MCNC: 320 UG/DL (ref 155–355)
WBC # BLD AUTO: 5.56 THOUSAND/UL (ref 4.31–10.16)

## 2024-03-07 PROCEDURE — 80053 COMPREHEN METABOLIC PANEL: CPT

## 2024-03-07 PROCEDURE — 36415 COLL VENOUS BLD VENIPUNCTURE: CPT

## 2024-03-07 PROCEDURE — 82306 VITAMIN D 25 HYDROXY: CPT

## 2024-03-07 PROCEDURE — 85025 COMPLETE CBC W/AUTO DIFF WBC: CPT

## 2024-03-07 PROCEDURE — 83540 ASSAY OF IRON: CPT

## 2024-03-07 PROCEDURE — 83550 IRON BINDING TEST: CPT

## 2024-03-07 PROCEDURE — 82728 ASSAY OF FERRITIN: CPT

## 2024-03-07 NOTE — TELEPHONE ENCOUNTER
Pt called as she saw her CBC results in her MyChart. I explained we would contact her once we received all of her results back. Pt understands.

## 2024-03-08 ENCOUNTER — TELEPHONE (OUTPATIENT)
Dept: FAMILY MEDICINE CLINIC | Facility: CLINIC | Age: 44
End: 2024-03-08

## 2024-03-08 DIAGNOSIS — E61.1 IRON DEFICIENCY: Primary | ICD-10-CM

## 2024-03-08 RX ORDER — FERROUS SULFATE 325(65) MG
325 TABLET ORAL EVERY OTHER DAY
Qty: 30 TABLET | Refills: 1 | Status: SHIPPED | OUTPATIENT
Start: 2024-03-08

## 2024-04-03 DIAGNOSIS — R51.9 NEW ONSET OF HEADACHES: ICD-10-CM

## 2024-04-03 RX ORDER — RIZATRIPTAN BENZOATE 5 MG/1
5 TABLET ORAL ONCE AS NEEDED
Qty: 10 TABLET | Refills: 0 | Status: SHIPPED | OUTPATIENT
Start: 2024-04-03

## 2024-07-01 ENCOUNTER — OFFICE VISIT (OUTPATIENT)
Dept: FAMILY MEDICINE CLINIC | Facility: CLINIC | Age: 44
End: 2024-07-01
Payer: COMMERCIAL

## 2024-07-01 VITALS
HEART RATE: 67 BPM | DIASTOLIC BLOOD PRESSURE: 80 MMHG | TEMPERATURE: 98 F | HEIGHT: 63 IN | RESPIRATION RATE: 16 BRPM | OXYGEN SATURATION: 97 % | SYSTOLIC BLOOD PRESSURE: 115 MMHG | WEIGHT: 146 LBS | BODY MASS INDEX: 25.87 KG/M2

## 2024-07-01 DIAGNOSIS — S46.812A STRAIN OF LEFT TRAPEZIUS MUSCLE, INITIAL ENCOUNTER: Primary | ICD-10-CM

## 2024-07-01 DIAGNOSIS — Z13.220 SCREENING, LIPID: ICD-10-CM

## 2024-07-01 PROCEDURE — 99213 OFFICE O/P EST LOW 20 MIN: CPT | Performed by: FAMILY MEDICINE

## 2024-07-01 RX ORDER — CYCLOBENZAPRINE HCL 5 MG
5 TABLET ORAL
Qty: 15 TABLET | Refills: 0 | Status: SHIPPED | OUTPATIENT
Start: 2024-07-01

## 2024-07-01 RX ORDER — MELOXICAM 7.5 MG/1
7.5 TABLET ORAL DAILY
Qty: 15 TABLET | Refills: 0 | Status: SHIPPED | OUTPATIENT
Start: 2024-07-01

## 2024-07-01 NOTE — PROGRESS NOTES
Ambulatory Visit  Name: Silva Rodriguez      : 1980      MRN: [id number]  Encounter Provider: Erick Reddy MD  Encounter Date: 2024   Encounter [facility]: [facility]    Assessment & Plan   1. Strain of left trapezius muscle, initial encounter  Comments:  Left sided shoulder pain for 3 weeks. No injuries. Likely a muscular strain from sleeping on a new bed.  Start muscle relaxer, heat, and NSAIDS.  Orders:  -     cyclobenzaprine (FLEXERIL) 5 mg tablet; Take 1 tablet (5 mg total) by mouth daily at bedtime  -     meloxicam (Mobic) 7.5 mg tablet; Take 1 tablet (7.5 mg total) by mouth daily  2. Screening, lipid  Comments:  Requested by her employer.  Orders:  -     Lipid panel; Future       History of Present Illness     Left shoulder pain for 3 weeks. No injuries. Pain exacerbated by nay movement. Tried ibuprofen, flexeril, and had a professional massage with little relief. Patient also developed a fever yesterday. Didn't record a temp but felt warm. No there symptoms.    Shoulder Pain   The pain is present in the neck, back and left shoulder. This is a new problem. The current episode started 1 to 4 weeks ago. There has been no history of extremity trauma. The problem occurs constantly. The problem has been gradually worsening. The quality of the pain is described as aching. Associated symptoms include a fever. The symptoms are aggravated by activity. She has tried NSAIDS, heat, movement, OTC pain meds and rest for the symptoms. The treatment provided no relief.       Review of Systems   Constitutional:  Positive for fever.     Past Medical History   Past Medical History:   Diagnosis Date    Asthma     Last Assessed 2012    BRCA1 negative     BRCA2 negative     Depression      Past Surgical History:   Procedure Laterality Date    ABDOMINOPLASTY      AUGMENTATION MAMMAPLASTY Bilateral 2022    silicone    OOPHORECTOMY N/A     Lateral     Family History   Problem  Relation Age of Onset    Depression Mother     Alcohol abuse Father     Kidney disease Father     No Known Problems Sister     No Known Problems Sister     Cataracts Maternal Grandmother     Hyperlipidemia Maternal Grandmother     Breast cancer Maternal Grandmother 72    Diabetes Maternal Grandfather     No Known Problems Paternal Grandmother     No Known Problems Paternal Grandfather     No Known Problems Son     No Known Problems Maternal Aunt     No Known Problems Maternal Aunt     Cancer Maternal Uncle 67        liver    No Known Problems Paternal Aunt     No Known Problems Paternal Aunt     No Known Problems Paternal Aunt     No Known Problems Paternal Aunt      Current Outpatient Medications on File Prior to Visit   Medication Sig Dispense Refill    ascorbic acid (VITAMIN C) 500 MG tablet Take 500 mg by mouth daily      clindamycin-benzoyl peroxide (BENZACLIN) gel apply twice a day to AFFECTED ACNE AREAS ON THE BODY      ferrous sulfate 325 (65 Fe) mg tablet Take 1 tablet (325 mg total) by mouth every other day 30 tablet 1    mometasone (ELOCON) 0.1 % ointment APPLY A THIN LAYER TWICE A DAY FOR 2 WEEKS TO DISCOLORATION PATCH...  (REFER TO PRESCRIPTION NOTES).      pimecrolimus (ELIDEL) 1 % cream APPLY A THIN LAYER TWICE DAILY FOR 8 WEEKS TO DISCOLORED PATCHES ON ARMS AND RED BUMMPPY LIZONES ON FACE AS DIRECTED      CALCIUM CARBONATE-VITAMIN D PO Take 1 tablet by mouth (Patient not taking: Reported on 7/1/2024)      intrauterine copper (PARAGARD) IUD  (Patient not taking: Reported on 11/22/2023)      methylPREDNISolone 4 MG tablet therapy pack Use as directed on package (Patient not taking: Reported on 12/22/2023) 1 each 0    ondansetron (ZOFRAN-ODT) 4 mg disintegrating tablet Take 1 tablet (4 mg total) by mouth every 6 (six) hours as needed for nausea or vomiting for up to 2 days (Patient not taking: Reported on 3/4/2024) 8 tablet 0    pantoprazole (PROTONIX) 40 mg tablet Take 1 tablet (40 mg total) by mouth  daily (Patient not taking: Reported on 7/1/2024) 30 tablet 1    rizatriptan (MAXALT) 5 mg tablet TAKE 1 TABLET (5 MG TOTAL) BY MOUTH ONCE AS NEEDED FOR MIGRAINE FOR UP TO 1 DOSE MAY REPEAT IN 2 HOURS IF NECESSARY (Patient not taking: Reported on 7/1/2024) 10 tablet 0    Sodium Fluoride 5000 PPM 1.1 % PSTE  (Patient not taking: Reported on 3/4/2024)      [DISCONTINUED] folic acid (FOLVITE) 400 mcg tablet Take 400 mcg by mouth daily (Patient not taking: Reported on 7/1/2024)       Current Facility-Administered Medications on File Prior to Visit   Medication Dose Route Frequency Provider Last Rate Last Admin    lidocaine-epinephrine (XYLOCAINE/EPINEPHRINE) 1 %-1:100,000 20 mL, sodium bicarbonate 2 mEq infiltration   Infiltration Once Robert Rosario MD       No Known Allergies   Current Outpatient Medications on File Prior to Visit   Medication Sig Dispense Refill    ascorbic acid (VITAMIN C) 500 MG tablet Take 500 mg by mouth daily      clindamycin-benzoyl peroxide (BENZACLIN) gel apply twice a day to AFFECTED ACNE AREAS ON THE BODY      ferrous sulfate 325 (65 Fe) mg tablet Take 1 tablet (325 mg total) by mouth every other day 30 tablet 1    mometasone (ELOCON) 0.1 % ointment APPLY A THIN LAYER TWICE A DAY FOR 2 WEEKS TO DISCOLORATION PATCH...  (REFER TO PRESCRIPTION NOTES).      pimecrolimus (ELIDEL) 1 % cream APPLY A THIN LAYER TWICE DAILY FOR 8 WEEKS TO DISCOLORED PATCHES ON ARMS AND RED BUMMPPY LIZONES ON FACE AS DIRECTED      CALCIUM CARBONATE-VITAMIN D PO Take 1 tablet by mouth (Patient not taking: Reported on 7/1/2024)      intrauterine copper (PARAGARD) IUD  (Patient not taking: Reported on 11/22/2023)      methylPREDNISolone 4 MG tablet therapy pack Use as directed on package (Patient not taking: Reported on 12/22/2023) 1 each 0    ondansetron (ZOFRAN-ODT) 4 mg disintegrating tablet Take 1 tablet (4 mg total) by mouth every 6 (six) hours as needed for nausea or vomiting for up to 2 days (Patient not  "taking: Reported on 3/4/2024) 8 tablet 0    pantoprazole (PROTONIX) 40 mg tablet Take 1 tablet (40 mg total) by mouth daily (Patient not taking: Reported on 7/1/2024) 30 tablet 1    rizatriptan (MAXALT) 5 mg tablet TAKE 1 TABLET (5 MG TOTAL) BY MOUTH ONCE AS NEEDED FOR MIGRAINE FOR UP TO 1 DOSE MAY REPEAT IN 2 HOURS IF NECESSARY (Patient not taking: Reported on 7/1/2024) 10 tablet 0    Sodium Fluoride 5000 PPM 1.1 % PSTE  (Patient not taking: Reported on 3/4/2024)      [DISCONTINUED] folic acid (FOLVITE) 400 mcg tablet Take 400 mcg by mouth daily (Patient not taking: Reported on 7/1/2024)       Current Facility-Administered Medications on File Prior to Visit   Medication Dose Route Frequency Provider Last Rate Last Admin    lidocaine-epinephrine (XYLOCAINE/EPINEPHRINE) 1 %-1:100,000 20 mL, sodium bicarbonate 2 mEq infiltration   Infiltration Once Robert Rosario MD          Social History     Tobacco Use    Smoking status: Never    Smokeless tobacco: Never   Vaping Use    Vaping status: Never Used   Substance and Sexual Activity    Alcohol use: Yes     Comment: social    Drug use: No    Sexual activity: Yes     Partners: Male     Birth control/protection: I.U.D.     Objective     /80 (BP Location: Left arm, Patient Position: Sitting, Cuff Size: Standard)   Pulse 67   Temp 98 °F (36.7 °C) (Tympanic)   Resp 16   Ht 5' 3\" (1.6 m)   Wt 66.2 kg (146 lb)   SpO2 97%   BMI 25.86 kg/m²     Physical Exam  Constitutional:       General: She is not in acute distress.     Appearance: Normal appearance. She is not ill-appearing or toxic-appearing.   HENT:      Head: Atraumatic.      Right Ear: Tympanic membrane normal.      Left Ear: Tympanic membrane normal.      Mouth/Throat:      Mouth: Mucous membranes are moist.      Pharynx: No posterior oropharyngeal erythema.   Cardiovascular:      Rate and Rhythm: Normal rate and regular rhythm.      Heart sounds: No murmur heard.  Pulmonary:      Effort: Pulmonary effort " is normal. No respiratory distress.      Breath sounds: Normal breath sounds. No stridor. No wheezing or rhonchi.   Musculoskeletal:        Arms:       Cervical back: Muscular tenderness present. No pain with movement or spinous process tenderness. Normal range of motion.   Neurological:      Mental Status: She is alert.   Psychiatric:         Mood and Affect: Mood normal.         Behavior: Behavior normal.

## 2024-07-27 DIAGNOSIS — S46.812A STRAIN OF LEFT TRAPEZIUS MUSCLE, INITIAL ENCOUNTER: ICD-10-CM

## 2024-07-27 DIAGNOSIS — E61.1 IRON DEFICIENCY: ICD-10-CM

## 2024-07-28 RX ORDER — MELOXICAM 7.5 MG/1
7.5 TABLET ORAL DAILY
Qty: 30 TABLET | Refills: 5 | Status: SHIPPED | OUTPATIENT
Start: 2024-07-28

## 2024-07-28 RX ORDER — FERROUS SULFATE 325(65) MG
1 TABLET ORAL EVERY OTHER DAY
Qty: 15 TABLET | Refills: 5 | Status: SHIPPED | OUTPATIENT
Start: 2024-07-28

## 2024-10-23 ENCOUNTER — HOSPITAL ENCOUNTER (EMERGENCY)
Facility: HOSPITAL | Age: 44
Discharge: HOME | End: 2024-10-23
Attending: EMERGENCY MEDICINE
Payer: COMMERCIAL

## 2024-10-23 VITALS
TEMPERATURE: 100.8 F | OXYGEN SATURATION: 98 % | DIASTOLIC BLOOD PRESSURE: 56 MMHG | RESPIRATION RATE: 18 BRPM | HEART RATE: 98 BPM | SYSTOLIC BLOOD PRESSURE: 108 MMHG

## 2024-10-23 DIAGNOSIS — J02.0 STREP THROAT: Primary | ICD-10-CM

## 2024-10-23 PROCEDURE — 99284 EMERGENCY DEPT VISIT MOD MDM: CPT | Performed by: EMERGENCY MEDICINE

## 2024-10-23 PROCEDURE — 99283 EMERGENCY DEPT VISIT LOW MDM: CPT

## 2024-10-23 RX ORDER — IBUPROFEN 400 MG/1
400 TABLET, FILM COATED ORAL ONCE
Status: DISCONTINUED | OUTPATIENT
Start: 2024-10-23 | End: 2024-10-23 | Stop reason: HOSPADM

## 2024-10-23 RX ORDER — AMOXICILLIN 500 MG/1
500 CAPSULE ORAL 3 TIMES DAILY
Qty: 30 CAPSULE | Refills: 0 | Status: SHIPPED | OUTPATIENT
Start: 2024-10-23 | End: 2024-11-02

## 2024-10-23 RX ORDER — AMOXICILLIN 250 MG/1
500 CAPSULE ORAL ONCE
Status: COMPLETED | OUTPATIENT
Start: 2024-10-23 | End: 2024-10-23

## 2024-10-23 RX ADMIN — DEXAMETHASONE 6 MG: 4 TABLET ORAL at 05:20

## 2024-10-23 RX ADMIN — AMOXICILLIN 500 MG: 250 CAPSULE ORAL at 05:20

## 2024-10-23 NOTE — DISCHARGE INSTRUCTIONS
You are seen in the emergency department for strep throat.  We are prescribing you an antibiotic for this.  Please take the full course as prescribed.  Please follow-up with your primary care doctor in the next few days for reevaluation.  Please return the emergency department should you experience any chest pain, shortness of breath, abdominal pain, or any other concerns symptoms that you would like evaluated.

## 2024-10-23 NOTE — ED PROVIDER NOTES
"Time reflects when diagnosis was documented in both MDM as applicable and the Disposition within this note       Time User Action Codes Description Comment    10/23/2024  5:21 AM Fran Bustillos [J02.0] Strep throat           ED Disposition       ED Disposition   Discharge    Condition   Stable    Date/Time   Wed Oct 23, 2024  5:21 AM    Comment   Silva Rodriguez discharge to home/self care.                   Assessment & Plan       Medical Decision Making  Patient is a 44 y.o. female with PMH asthma, depression who presents to the ED with fevers, sore throat, myalgias.    Vital signs febrile, otherwise stable. Physical exam as above.    History and physical exam most consistent with strep throat. However, differential diagnosis included but not limited to peritonsillar abscess, retropharyngeal abscess, URI, mononucleosis, pneumonia.     Plan: We will order Decadron, amoxicillin, and ibuprofen.  Anticipate discharge.    View ED course above for further discussion on patient workup.     On review of previous records, patient seen by PCP in July.  Visit note reviewed.    All labs reviewed and utilized in the medical decision making process  All radiology studies independently viewed by me and interpreted by the radiologist.  I reviewed all testing with the patient.     Upon re-evaluation, patient able to tolerate oral intake.  Patient remained stable.    Disposition: Patient discharged stable condition.  Patient given strict return precautions.  Patient given prescription for amoxicillin.  Patient will follow-up with her primary care in the next few days for reevaluation.    Portions of the record may have been created with voice recognition software. Occasional wrong word or \"sound a like\" substitutions may have occurred due to the inherent limitations of voice recognition software. Read the chart carefully and recognize, using context, where substitutions have occurred.     Risk  Prescription drug " management.             Medications   ibuprofen (MOTRIN) tablet 400 mg (400 mg Oral Not Given 10/23/24 0520)   amoxicillin (AMOXIL) capsule 500 mg (500 mg Oral Given 10/23/24 0520)   dexamethasone (DECADRON) tablet 6 mg (6 mg Oral Given 10/23/24 0520)       ED Risk Strat Scores                                               History of Present Illness       Chief Complaint   Patient presents with    Flu Symptoms     PT. Complaining of a sore throat, earache, and overall not feeling well. Been feeling like she's having fevers. Started on Monday took Nyquil at 2230.        Past Medical History:   Diagnosis Date    Asthma     Last Assessed 08Jun2012    BRCA1 negative     BRCA2 negative     Depression       Past Surgical History:   Procedure Laterality Date    ABDOMINOPLASTY      AUGMENTATION MAMMAPLASTY Bilateral 08/30/2022    silicone    OOPHORECTOMY N/A     Lateral      Family History   Problem Relation Age of Onset    Depression Mother     Alcohol abuse Father     Kidney disease Father     No Known Problems Sister     No Known Problems Sister     Cataracts Maternal Grandmother     Hyperlipidemia Maternal Grandmother     Breast cancer Maternal Grandmother 72    Diabetes Maternal Grandfather     No Known Problems Paternal Grandmother     No Known Problems Paternal Grandfather     No Known Problems Son     No Known Problems Maternal Aunt     No Known Problems Maternal Aunt     Cancer Maternal Uncle 67        liver    No Known Problems Paternal Aunt     No Known Problems Paternal Aunt     No Known Problems Paternal Aunt     No Known Problems Paternal Aunt       Social History     Tobacco Use    Smoking status: Never    Smokeless tobacco: Never   Vaping Use    Vaping status: Never Used   Substance Use Topics    Alcohol use: Yes     Comment: social    Drug use: No      E-Cigarette/Vaping    E-Cigarette Use Never User       E-Cigarette/Vaping Substances    Nicotine No     THC No     CBD No     Flavoring No     Other No      Unknown No       I have reviewed and agree with the history as documented.     Patient is a 44-year-old female with past medical history of asthma, depression who presents today with fever, chills, ear pain, sore throat since Monday.  Patient states that she has been using ibuprofen and NyQuil, but feels like her symptoms have been getting slightly worse.  Patient notes that she has also had bodyaches for the same amount of time.  Patient denies any cough during this time.  Patient denies any sick contacts.  Patient notes that she has had a mild headache as well.  Patient denies any chest pain, shortness of breath, abdominal pain, nausea, vomiting, diarrhea, constipation, vision changes, weakness.          Review of Systems   Constitutional:  Positive for chills, fatigue and fever.   HENT:  Positive for sore throat. Negative for ear pain.    Eyes:  Negative for pain and visual disturbance.   Respiratory:  Negative for cough and shortness of breath.    Cardiovascular:  Negative for chest pain and palpitations.   Gastrointestinal:  Negative for abdominal pain and vomiting.   Genitourinary:  Negative for dysuria and hematuria.   Musculoskeletal:  Positive for myalgias. Negative for arthralgias and back pain.   Skin:  Negative for color change and rash.   Neurological:  Positive for headaches. Negative for seizures and syncope.   All other systems reviewed and are negative.          Objective       ED Triage Vitals [10/23/24 0452]   Temperature Pulse Blood Pressure Respirations SpO2 Patient Position - Orthostatic VS   (!) 100.8 °F (38.2 °C) 98 108/56 18 98 % Sitting      Temp Source Heart Rate Source BP Location FiO2 (%) Pain Score    Temporal Monitor Right arm -- 4      Vitals      Date and Time Temp Pulse SpO2 Resp BP Pain Score FACES Pain Rating User   10/23/24 0520 -- -- -- -- -- 10 - Worst Possible Pain -- RJ   10/23/24 0452 100.8 °F (38.2 °C) 98 98 % 18 108/56 4 -- BK            Physical Exam  Vitals and nursing  note reviewed.   Constitutional:       General: She is not in acute distress.     Appearance: Normal appearance. She is well-developed.   HENT:      Head: Normocephalic and atraumatic.      Right Ear: Tympanic membrane, ear canal and external ear normal.      Left Ear: Tympanic membrane, ear canal and external ear normal.      Mouth/Throat:      Mouth: Mucous membranes are moist.      Pharynx: Oropharyngeal exudate and posterior oropharyngeal erythema present. No postnasal drip.      Tonsils: Tonsillar exudate present. No tonsillar abscesses.   Eyes:      Conjunctiva/sclera: Conjunctivae normal.   Cardiovascular:      Rate and Rhythm: Normal rate and regular rhythm.      Pulses: Normal pulses.      Heart sounds: Normal heart sounds. No murmur heard.     No friction rub. No gallop.   Pulmonary:      Effort: Pulmonary effort is normal. No respiratory distress.      Breath sounds: Normal breath sounds. No stridor. No wheezing, rhonchi or rales.   Abdominal:      General: Abdomen is flat. Bowel sounds are normal. There is no distension.      Palpations: Abdomen is soft. There is no mass.      Tenderness: There is no abdominal tenderness. There is no guarding or rebound.   Musculoskeletal:         General: No swelling. Normal range of motion.      Cervical back: Neck supple.   Lymphadenopathy:      Cervical: Cervical adenopathy (Tender to palpation) present.      Right cervical: Superficial cervical adenopathy present.      Left cervical: Superficial cervical adenopathy present.   Skin:     General: Skin is warm and dry.      Capillary Refill: Capillary refill takes less than 2 seconds.   Neurological:      General: No focal deficit present.      Mental Status: She is alert and oriented to person, place, and time.   Psychiatric:         Mood and Affect: Mood normal.         Results Reviewed       None            No orders to display       Procedures    ED Medication and Procedure Management   Prior to Admission  Medications   Prescriptions Last Dose Informant Patient Reported? Taking?   CALCIUM CARBONATE-VITAMIN D PO   Yes No   Sig: Take 1 tablet by mouth   Patient not taking: Reported on 7/1/2024   Sodium Fluoride 5000 PPM 1.1 % PSTE   Yes No   Patient not taking: Reported on 3/4/2024   ascorbic acid (VITAMIN C) 500 MG tablet   Yes No   Sig: Take 500 mg by mouth daily   clindamycin-benzoyl peroxide (BENZACLIN) gel   Yes No   Sig: apply twice a day to AFFECTED ACNE AREAS ON THE BODY   cyclobenzaprine (FLEXERIL) 5 mg tablet   No No   Sig: Take 1 tablet (5 mg total) by mouth daily at bedtime   ferrous sulfate 325 (65 Fe) mg tablet   No No   Sig: TAKE 1 TABLET BY MOUTH EVERY OTHER DAY   intrauterine copper (PARAGARD) IUD   Yes No   Patient not taking: Reported on 11/22/2023   meloxicam (MOBIC) 7.5 mg tablet   No No   Sig: TAKE 1 TABLET BY MOUTH EVERY DAY   methylPREDNISolone 4 MG tablet therapy pack   No No   Sig: Use as directed on package   Patient not taking: Reported on 12/22/2023   mometasone (ELOCON) 0.1 % ointment   Yes No   Sig: APPLY A THIN LAYER TWICE A DAY FOR 2 WEEKS TO DISCOLORATION PATCH...  (REFER TO PRESCRIPTION NOTES).   ondansetron (ZOFRAN-ODT) 4 mg disintegrating tablet   No No   Sig: Take 1 tablet (4 mg total) by mouth every 6 (six) hours as needed for nausea or vomiting for up to 2 days   Patient not taking: Reported on 3/4/2024   pantoprazole (PROTONIX) 40 mg tablet   No No   Sig: Take 1 tablet (40 mg total) by mouth daily   Patient not taking: Reported on 7/1/2024   pimecrolimus (ELIDEL) 1 % cream   Yes No   Sig: APPLY A THIN LAYER TWICE DAILY FOR 8 WEEKS TO DISCOLORED PATCHES ON ARMS AND RED BUMMPPY LIZONES ON FACE AS DIRECTED   rizatriptan (MAXALT) 5 mg tablet   No No   Sig: TAKE 1 TABLET (5 MG TOTAL) BY MOUTH ONCE AS NEEDED FOR MIGRAINE FOR UP TO 1 DOSE MAY REPEAT IN 2 HOURS IF NECESSARY   Patient not taking: Reported on 7/1/2024      Facility-Administered Medications: None     Patient's Medications    Discharge Prescriptions    AMOXICILLIN (AMOXIL) 500 MG CAPSULE    Take 1 capsule (500 mg total) by mouth 3 (three) times a day for 10 days       Start Date: 10/23/2024End Date: 11/2/2024       Order Dose: 500 mg       Quantity: 30 capsule    Refills: 0     No discharge procedures on file.  ED SEPSIS DOCUMENTATION   Time reflects when diagnosis was documented in both MDM as applicable and the Disposition within this note       Time User Action Codes Description Comment    10/23/2024  5:21 AM Fran Bustillos Add [J02.0] Strep throat                  Fran Bustillos DO  10/23/24 0534

## 2024-10-23 NOTE — ED ATTENDING ATTESTATION
10/23/2024  IZeus MD, saw and evaluated the patient. I have discussed the patient with the resident/non-physician practitioner and agree with the resident's/non-physician practitioner's findings, Plan of Care, and MDM as documented in the resident's/non-physician practitioner's note, except where noted. All available labs and Radiology studies were reviewed.  I was present for key portions of any procedure(s) performed by the resident/non-physician practitioner and I was immediately available to provide assistance.       At this point I agree with the current assessment done in the Emergency Department.  I have conducted an independent evaluation of this patient a history and physical is as follows:    Final Diagnosis:  1. Strep throat      Chief Complaint   Patient presents with    Flu Symptoms     PT. Complaining of a sore throat, earache, and overall not feeling well. Been feeling like she's having fevers. Started on Monday took Nyquil at 2230.            A:  -44-year-old female who presents with sore throat, body aches, fever.      P:  -Likely strep throat.  Will treat with amoxicillin.  Treat symptomatically with ibuprofen and p.o. Decadron.  Follow-up with family doctor.      H:    44-year-old female presents with fever, sore throat, body aches.  Symptoms started on Monday.      PMH:  Past Medical History:   Diagnosis Date    Asthma     Last Assessed 08Jun2012    BRCA1 negative     BRCA2 negative     Depression        PSH:  Past Surgical History:   Procedure Laterality Date    ABDOMINOPLASTY      AUGMENTATION MAMMAPLASTY Bilateral 08/30/2022    silicone    OOPHORECTOMY N/A     Lateral         PE:   Vitals:    10/23/24 0452   BP: 108/56   BP Location: Right arm   Pulse: 98   Resp: 18   Temp: (!) 100.8 °F (38.2 °C)   TempSrc: Temporal   SpO2: 98%         Constitutional: Vital signs are normal. She appears well-developed. She is cooperative. No distress.   Mouth: Left-sided tonsillar exudate.  No  evidence of peritonsillar abscess.  Neck: Left-sided tender anterior cervical lymphadenopathy.  Cardiovascular: Normal rate, regular rhythm.  Pulmonary/Chest: Effort normal.   Abdominal: Soft. Normal appearance.   Neurological: She is alert.   Skin: Skin is warm, dry and intact.   Psychiatric: She has a normal mood and affect. Her speech is normal and behavior is normal. Thought content normal.          - 13 point ROS was performed and all are normal unless stated in the history above.   - Nursing note reviewed. Vitals reviewed.   - Orders placed by myself and/or advanced practitioner / resident.    - Previous chart was reviewed  - No language barrier.   - History obtained from patient.   - There are no limitations to the history obtained. Reasons ROS could not be obtained:  N/A         Medications   ibuprofen (MOTRIN) tablet 400 mg (400 mg Oral Not Given 10/23/24 0520)   amoxicillin (AMOXIL) capsule 500 mg (500 mg Oral Given 10/23/24 0520)   dexamethasone (DECADRON) tablet 6 mg (6 mg Oral Given 10/23/24 0520)     No orders to display     No orders of the defined types were placed in this encounter.    Labs Reviewed - No data to display  Time reflects when diagnosis was documented in both MDM as applicable and the Disposition within this note       Time User Action Codes Description Comment    10/23/2024  5:21 AM Fran Bustillos [J02.0] Strep throat           ED Disposition       ED Disposition   Discharge    Condition   Stable    Date/Time   Wed Oct 23, 2024  5:21 AM    Comment   Silav Rodriguez discharge to home/self care.                   Follow-up Information       Follow up With Specialties Details Why Contact Info Additional Information    Erick Reddy MD Family Medicine Schedule an appointment as soon as possible for a visit in 3 days  [address] [address] [phone]       [facility] Emergency Medicine Go to  If symptoms  [address] [facility] [id number] [phone] [facility], [address], [place], [address] [phone]          Patient's Medications   Discharge Prescriptions    AMOXICILLIN (AMOXIL) 500 MG CAPSULE    Take 1 capsule (500 mg total) by mouth 3 (three) times a day for 10 days       Start Date: 10/23/2024End Date: 11/2/2024       Order Dose: 500 mg       Quantity: 30 capsule    Refills: 0     No discharge procedures on file.  Prior to Admission Medications   Prescriptions Last Dose Informant Patient Reported? Taking?   CALCIUM CARBONATE-VITAMIN D PO   Yes No   Sig: Take 1 tablet by mouth   Patient not taking: Reported on 7/1/2024   Sodium Fluoride 5000 PPM 1.1 % PSTE   Yes No   Patient not taking: Reported on 3/4/2024   ascorbic acid (VITAMIN C) 500 MG tablet   Yes No   Sig: Take 500 mg by mouth daily   clindamycin-benzoyl peroxide (BENZACLIN) gel   Yes No   Sig: apply twice a day to AFFECTED ACNE AREAS ON THE BODY   cyclobenzaprine (FLEXERIL) 5 mg tablet   No No   Sig: Take 1 tablet (5 mg total) by mouth daily at bedtime   ferrous sulfate 325 (65 Fe) mg tablet   No No   Sig: TAKE 1 TABLET BY MOUTH EVERY OTHER DAY   intrauterine copper (PARAGARD) IUD   Yes No   Patient not taking: Reported on 11/22/2023   meloxicam (MOBIC) 7.5 mg tablet   No No   Sig: TAKE 1 TABLET BY MOUTH EVERY DAY   methylPREDNISolone 4 MG tablet therapy pack   No No   Sig: Use as directed on package   Patient not taking: Reported on 12/22/2023   mometasone (ELOCON) 0.1 % ointment   Yes No   Sig: APPLY A THIN LAYER TWICE A DAY FOR 2 WEEKS TO DISCOLORATION PATCH...  (REFER TO PRESCRIPTION NOTES).   ondansetron (ZOFRAN-ODT) 4 mg disintegrating tablet   No No   Sig: Take 1 tablet (4 mg total) by mouth every 6 (six) hours as needed for nausea or vomiting for up to 2 days   Patient not taking: Reported on 3/4/2024   pantoprazole (PROTONIX) 40 mg tablet   No No   Sig:  "Take 1 tablet (40 mg total) by mouth daily   Patient not taking: Reported on 7/1/2024   pimecrolimus (ELIDEL) 1 % cream   Yes No   Sig: APPLY A THIN LAYER TWICE DAILY FOR 8 WEEKS TO DISCOLORED PATCHES ON ARMS AND RED BUMMPPY LIZONES ON FACE AS DIRECTED   rizatriptan (MAXALT) 5 mg tablet   No No   Sig: TAKE 1 TABLET (5 MG TOTAL) BY MOUTH ONCE AS NEEDED FOR MIGRAINE FOR UP TO 1 DOSE MAY REPEAT IN 2 HOURS IF NECESSARY   Patient not taking: Reported on 7/1/2024      Facility-Administered Medications: None       Portions of the record may have been created with voice recognition software. Occasional wrong word or \"sound a like\" substitutions may have occurred due to the inherent limitations of voice recognition software. Read the chart carefully and recognize, using context, where substitutions have occurred.       ED Course         Critical Care Time  Procedures      "

## 2024-10-23 NOTE — Clinical Note
Silva Rodriguez was seen and treated in our emergency department on 10/23/2024.                Diagnosis:     Silva  may return to work on return date.    She may return on this date: 10/25/2024         If you have any questions or concerns, please don't hesitate to call.      Fran Bustillos, DO    ______________________________           _______________          _______________  Hospital Representative                              Date                                Time

## 2025-07-14 ENCOUNTER — APPOINTMENT (OUTPATIENT)
Dept: LAB | Facility: CLINIC | Age: 45
End: 2025-07-14
Payer: COMMERCIAL

## 2025-07-14 ENCOUNTER — OFFICE VISIT (OUTPATIENT)
Dept: OBGYN CLINIC | Facility: CLINIC | Age: 45
End: 2025-07-14

## 2025-07-14 VITALS
SYSTOLIC BLOOD PRESSURE: 105 MMHG | WEIGHT: 151.2 LBS | DIASTOLIC BLOOD PRESSURE: 67 MMHG | BODY MASS INDEX: 27.82 KG/M2 | RESPIRATION RATE: 18 BRPM | HEART RATE: 68 BPM | HEIGHT: 62 IN

## 2025-07-14 DIAGNOSIS — Z72.51 HIGH RISK HETEROSEXUAL BEHAVIOR: ICD-10-CM

## 2025-07-14 DIAGNOSIS — Z72.51 HIGH RISK HETEROSEXUAL BEHAVIOR: Primary | ICD-10-CM

## 2025-07-14 DIAGNOSIS — N95.1 PERIMENOPAUSE: ICD-10-CM

## 2025-07-14 DIAGNOSIS — Z30.011 ENCOUNTER FOR INITIAL PRESCRIPTION OF CONTRACEPTIVE PILLS: ICD-10-CM

## 2025-07-14 LAB — FSH SERPL-ACNC: 8.9 MIU/ML

## 2025-07-14 PROCEDURE — 87389 HIV-1 AG W/HIV-1&-2 AB AG IA: CPT

## 2025-07-14 PROCEDURE — 86780 TREPONEMA PALLIDUM: CPT

## 2025-07-14 PROCEDURE — 87591 N.GONORRHOEAE DNA AMP PROB: CPT

## 2025-07-14 PROCEDURE — 99213 OFFICE O/P EST LOW 20 MIN: CPT | Performed by: OBSTETRICS & GYNECOLOGY

## 2025-07-14 PROCEDURE — 86803 HEPATITIS C AB TEST: CPT

## 2025-07-14 PROCEDURE — 36415 COLL VENOUS BLD VENIPUNCTURE: CPT

## 2025-07-14 PROCEDURE — 87340 HEPATITIS B SURFACE AG IA: CPT

## 2025-07-14 PROCEDURE — 87491 CHLMYD TRACH DNA AMP PROBE: CPT

## 2025-07-14 PROCEDURE — 83001 ASSAY OF GONADOTROPIN (FSH): CPT

## 2025-07-14 RX ORDER — NORETHINDRONE ACETATE AND ETHINYL ESTRADIOL .02; 1 MG/1; MG/1
1 TABLET ORAL DAILY
Qty: 28 TABLET | Refills: 3 | Status: SHIPPED | OUTPATIENT
Start: 2025-07-14 | End: 2025-10-12

## 2025-07-14 NOTE — ASSESSMENT & PLAN NOTE
-Patient with symptoms of hot flashes, insomnia, weight gain, vaginal dryness  -Desires hormone replacement but is still having regular periods  -Recommend OCP's and FSH   -Continue melatonin, patient declining Paroxetine  -Will return in 3 months to discuss outcome

## 2025-07-15 LAB
C TRACH DNA SPEC QL NAA+PROBE: NEGATIVE
HBV SURFACE AG SER QL: NORMAL
HCV AB SER QL: NORMAL
HIV 1+2 AB+HIV1 P24 AG SERPL QL IA: NORMAL
N GONORRHOEA DNA SPEC QL NAA+PROBE: NEGATIVE
TREPONEMA PALLIDUM IGG+IGM AB [PRESENCE] IN SERUM OR PLASMA BY IMMUNOASSAY: NORMAL